# Patient Record
Sex: FEMALE | Race: WHITE | Employment: OTHER | URBAN - NONMETROPOLITAN AREA
[De-identification: names, ages, dates, MRNs, and addresses within clinical notes are randomized per-mention and may not be internally consistent; named-entity substitution may affect disease eponyms.]

---

## 2023-12-20 PROBLEM — L71.9 ROSACEA, UNSPECIFIED: Status: ACTIVE | Noted: 2022-07-29

## 2023-12-20 PROBLEM — E78.5 DYSLIPIDEMIA: Status: ACTIVE | Noted: 2021-01-06

## 2023-12-20 PROBLEM — M85.80 OSTEOPENIA: Status: ACTIVE | Noted: 2023-12-20

## 2023-12-20 PROBLEM — R71.8 ELEVATED HEMATOCRIT: Status: ACTIVE | Noted: 2022-06-23

## 2023-12-20 PROBLEM — R80.9 CONTROLLED TYPE 2 DIABETES MELLITUS WITH MICROALBUMINURIA, WITHOUT LONG-TERM CURRENT USE OF INSULIN (MULTI): Status: ACTIVE | Noted: 2023-09-20

## 2023-12-20 PROBLEM — K21.9 GERD (GASTROESOPHAGEAL REFLUX DISEASE): Status: ACTIVE | Noted: 2023-12-20

## 2023-12-20 PROBLEM — L82.1 OTHER SEBORRHEIC KERATOSIS: Status: ACTIVE | Noted: 2022-07-29

## 2023-12-20 PROBLEM — L81.4 OTHER MELANIN HYPERPIGMENTATION: Status: ACTIVE | Noted: 2022-07-29

## 2023-12-20 PROBLEM — R80.9 MICROALBUMINURIA: Status: ACTIVE | Noted: 2022-06-23

## 2023-12-20 PROBLEM — E55.9 VITAMIN D DEFICIENCY: Status: ACTIVE | Noted: 2019-11-20

## 2023-12-20 PROBLEM — E66.812 OBESITY, CLASS II, BMI 35-39.9: Status: ACTIVE | Noted: 2023-12-20

## 2023-12-20 PROBLEM — R35.89 POLYURIA: Status: ACTIVE | Noted: 2019-11-20

## 2023-12-20 PROBLEM — R53.82 CHRONIC FATIGUE: Status: ACTIVE | Noted: 2019-11-20

## 2023-12-20 PROBLEM — E11.29 CONTROLLED TYPE 2 DIABETES MELLITUS WITH MICROALBUMINURIA, WITHOUT LONG-TERM CURRENT USE OF INSULIN (MULTI): Status: ACTIVE | Noted: 2023-09-20

## 2023-12-20 PROBLEM — D18.01 HEMANGIOMA OF SKIN AND SUBCUTANEOUS TISSUE: Status: ACTIVE | Noted: 2022-07-29

## 2023-12-20 PROBLEM — E66.9 OBESITY, CLASS II, BMI 35-39.9: Status: ACTIVE | Noted: 2023-12-20

## 2023-12-20 PROBLEM — R10.12 INTERMITTENT LEFT UPPER QUADRANT ABDOMINAL PAIN: Status: ACTIVE | Noted: 2023-06-20

## 2023-12-20 PROBLEM — R63.1 POLYDIPSIA: Status: ACTIVE | Noted: 2019-11-20

## 2023-12-20 PROBLEM — R10.13 EPIGASTRIC PAIN: Status: ACTIVE | Noted: 2019-11-20

## 2023-12-20 PROBLEM — M17.10 ARTHRITIS OF KNEE: Status: ACTIVE | Noted: 2023-12-20

## 2023-12-20 PROBLEM — I10 HYPERTENSION: Status: ACTIVE | Noted: 2023-12-20

## 2023-12-20 PROBLEM — E87.1 HYPONATREMIA: Status: ACTIVE | Noted: 2023-03-20

## 2023-12-20 PROBLEM — E11.65 UNCONTROLLED TYPE 2 DIABETES MELLITUS WITH HYPERGLYCEMIA (MULTI): Status: ACTIVE | Noted: 2022-09-23

## 2023-12-20 RX ORDER — ERGOCALCIFEROL 1.25 MG/1
50000 CAPSULE ORAL WEEKLY
COMMUNITY
Start: 2023-12-19

## 2023-12-20 RX ORDER — LOSARTAN POTASSIUM AND HYDROCHLOROTHIAZIDE 12.5; 1 MG/1; MG/1
1 TABLET ORAL
COMMUNITY
Start: 2023-12-19 | End: 2023-12-20 | Stop reason: WASHOUT

## 2023-12-20 RX ORDER — OMEPRAZOLE 20 MG/1
20 CAPSULE, DELAYED RELEASE ORAL
COMMUNITY
Start: 2023-12-14

## 2023-12-20 RX ORDER — ASPIRIN 81 MG/1
81 TABLET ORAL
COMMUNITY

## 2023-12-20 RX ORDER — METRONIDAZOLE 7.5 MG/G
CREAM TOPICAL
Status: ON HOLD | COMMUNITY
Start: 2022-05-27 | End: 2024-01-11 | Stop reason: WASHOUT

## 2023-12-20 RX ORDER — METFORMIN HYDROCHLORIDE 500 MG/1
500 TABLET ORAL
COMMUNITY
Start: 2023-12-19

## 2023-12-22 ENCOUNTER — OFFICE VISIT (OUTPATIENT)
Dept: SURGERY | Facility: CLINIC | Age: 73
End: 2023-12-22
Payer: MEDICARE

## 2023-12-22 VITALS
SYSTOLIC BLOOD PRESSURE: 130 MMHG | WEIGHT: 232 LBS | HEIGHT: 68 IN | BODY MASS INDEX: 35.16 KG/M2 | DIASTOLIC BLOOD PRESSURE: 66 MMHG | TEMPERATURE: 97.5 F | HEART RATE: 90 BPM

## 2023-12-22 DIAGNOSIS — K80.20 GALLSTONES: Primary | ICD-10-CM

## 2023-12-22 DIAGNOSIS — R10.13 EPIGASTRIC PAIN: ICD-10-CM

## 2023-12-22 DIAGNOSIS — R11.0 NAUSEA: ICD-10-CM

## 2023-12-22 DIAGNOSIS — M79.2 NEURALGIA: ICD-10-CM

## 2023-12-22 PROCEDURE — 3078F DIAST BP <80 MM HG: CPT | Performed by: SURGERY

## 2023-12-22 PROCEDURE — 3075F SYST BP GE 130 - 139MM HG: CPT | Performed by: SURGERY

## 2023-12-22 PROCEDURE — 1036F TOBACCO NON-USER: CPT | Performed by: SURGERY

## 2023-12-22 PROCEDURE — 99204 OFFICE O/P NEW MOD 45 MIN: CPT | Performed by: SURGERY

## 2023-12-22 PROCEDURE — 1159F MED LIST DOCD IN RCRD: CPT | Performed by: SURGERY

## 2023-12-22 PROCEDURE — 1160F RVW MEDS BY RX/DR IN RCRD: CPT | Performed by: SURGERY

## 2023-12-22 PROCEDURE — 4010F ACE/ARB THERAPY RXD/TAKEN: CPT | Performed by: SURGERY

## 2023-12-22 ASSESSMENT — ENCOUNTER SYMPTOMS
ABDOMINAL PAIN: 1
NAUSEA: 1

## 2023-12-22 NOTE — PATIENT INSTRUCTIONS
You have gallstones but these do not appear to be causing your symptoms.  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  I will apply heat and try topical Voltaren here.  Will order HIDA scan with ejection fraction to rule out a lazy gallbladder.  This is an entity known as biliary dyskinesia.  I will also ask your primary care physician to refer you to cardiology based on the left chest pain and the left shoulder pain that radiated down your arm at your last attack

## 2023-12-22 NOTE — PROGRESS NOTES
Subjective   Patient ID: Rachele Major is a 73 y.o. female who presents for epigastric pain  HPI   This is a 71-year-old female who presented with a 1 year history of intermittent sudden onset epigastric pain.  The pain is described as severe and seems to stay in the same area it is associate with nausea.  She has the pain persisting for 1 to 2 days sometimes.  She is not able to eat.  She had some willing weight loss.  The last episode involved pain radiating to the neck and in the left shoulder.  She had a sonogram confirming gallstones.  Liver tests were normal.  She had negative colonoscopy within 5 years.  She does have a family history of colon carcinoma.  The patient takes oral omeprazole  History reviewed. No pertinent past medical history.     Current Outpatient Medications on File Prior to Visit   Medication Sig Dispense Refill    aspirin 81 mg EC tablet Take 1 tablet (81 mg) by mouth once daily.      ergocalciferol (Vitamin D-2) 1.25 MG (53345 UT) capsule Take 1 capsule (50,000 Units) by mouth once a week.      losartan (Cozaar) 2.5 mg/mL oral suspension       metFORMIN (Glucophage) 500 mg tablet Take 1 tablet (500 mg) by mouth 2 times a day with meals.      omeprazole (PriLOSEC) 20 mg DR capsule 1 capsule (20 mg).      metroNIDAZOLE (Metrocream) 0.75 % cream 1 application      [DISCONTINUED] losartan-hydrochlorothiazide (Hyzaar) 100-12.5 mg tablet Take 1 tablet by mouth once daily.       No current facility-administered medications on file prior to visit.        Review of Systems   Cardiovascular:  Positive for chest pain.   Gastrointestinal:  Positive for abdominal pain and nausea.       Vitals:    12/22/23 1343   BP: 130/66   Pulse: 90   Temp: 36.4 °C (97.5 °F)        Objective     Physical Exam  Vitals reviewed. Exam conducted with a chaperone present.   Constitutional:       Appearance: Normal appearance.   HENT:      Head: Normocephalic.      Nose: Nose normal.      Mouth/Throat:      Pharynx:  Oropharynx is clear.   Cardiovascular:      Rate and Rhythm: Normal rate and regular rhythm.      Heart sounds: Normal heart sounds.   Pulmonary:      Effort: Pulmonary effort is normal.      Breath sounds: Normal breath sounds.   Abdominal:      General: Abdomen is flat.      Palpations: Abdomen is soft. There is no mass.      Tenderness: There is abdominal tenderness in the left upper quadrant. There is no guarding.      Hernia: No hernia is present.      Comments: Locally tender to deep palpation left subcostal region.  Has a diastases.   Musculoskeletal:         General: Normal range of motion.      Cervical back: Normal range of motion.   Skin:     General: Skin is warm.   Neurological:      General: No focal deficit present.   Psychiatric:         Mood and Affect: Mood normal.         US ABD SPLEEN - NB  Order: [id number]  Impression    IMPRESSION:    Cholelithiasis.  Hepatic steatosis.  Normal spleen.      CT ABD/PEL W IVCON  Order: [id number]  Impression    IMPRESSION:    No acute findings in abdomen or pelvis and specifically no mass or fluid  collection in left upper quadrant of the abdomen.  Hepatic steatosis.  Contracted gallbladder with questionable calculus.  Recommend dedicated  ultrasound of the gallbladder.  Problem List Items Addressed This Visit       Epigastric pain    Gallstones - Primary    Neuralgia    Nausea        Assessment/Plan   Likely local thoracic neuritis.  Asymptomatic gallstones.  Possible.  Biliary dyskinesia.  Topical Voltaren to the left subcostal region.  HIDA scan with ejection fraction to rule out biliary dyskinesia    Reid Rodriguez MD

## 2023-12-29 ENCOUNTER — APPOINTMENT (OUTPATIENT)
Dept: SURGERY | Facility: CLINIC | Age: 73
End: 2023-12-29
Payer: MEDICARE

## 2024-01-04 ENCOUNTER — APPOINTMENT (OUTPATIENT)
Dept: RADIOLOGY | Facility: HOSPITAL | Age: 74
End: 2024-01-04
Payer: MEDICARE

## 2024-01-05 ENCOUNTER — APPOINTMENT (OUTPATIENT)
Dept: RADIOLOGY | Facility: HOSPITAL | Age: 74
End: 2024-01-05
Payer: MEDICARE

## 2024-01-11 ENCOUNTER — APPOINTMENT (OUTPATIENT)
Dept: RADIOLOGY | Facility: HOSPITAL | Age: 74
DRG: 418 | End: 2024-01-11
Payer: MEDICARE

## 2024-01-11 ENCOUNTER — HOSPITAL ENCOUNTER (OUTPATIENT)
Dept: RADIOLOGY | Facility: HOSPITAL | Age: 74
Discharge: HOME | DRG: 418 | End: 2024-01-11
Payer: MEDICARE

## 2024-01-11 ENCOUNTER — HOSPITAL ENCOUNTER (INPATIENT)
Facility: HOSPITAL | Age: 74
LOS: 3 days | Discharge: HOME | DRG: 418 | End: 2024-01-14
Attending: STUDENT IN AN ORGANIZED HEALTH CARE EDUCATION/TRAINING PROGRAM | Admitting: INTERNAL MEDICINE
Payer: MEDICARE

## 2024-01-11 ENCOUNTER — APPOINTMENT (OUTPATIENT)
Dept: CARDIOLOGY | Facility: HOSPITAL | Age: 74
DRG: 418 | End: 2024-01-11
Payer: MEDICARE

## 2024-01-11 DIAGNOSIS — G89.18 POST-OP PAIN: ICD-10-CM

## 2024-01-11 DIAGNOSIS — R11.2 PONV (POSTOPERATIVE NAUSEA AND VOMITING): ICD-10-CM

## 2024-01-11 DIAGNOSIS — R10.9 ABDOMINAL PAIN, UNSPECIFIED ABDOMINAL LOCATION: ICD-10-CM

## 2024-01-11 DIAGNOSIS — K80.20 GALLSTONES: ICD-10-CM

## 2024-01-11 DIAGNOSIS — R11.0 NAUSEA: ICD-10-CM

## 2024-01-11 DIAGNOSIS — K83.1 COMMON BILE DUCT (CBD) OBSTRUCTION (CMS-HCC): Primary | ICD-10-CM

## 2024-01-11 DIAGNOSIS — Z98.890 PONV (POSTOPERATIVE NAUSEA AND VOMITING): ICD-10-CM

## 2024-01-11 DIAGNOSIS — Z90.49 S/P CHOLECYSTECTOMY: ICD-10-CM

## 2024-01-11 DIAGNOSIS — R10.13 EPIGASTRIC PAIN: ICD-10-CM

## 2024-01-11 LAB
ALBUMIN SERPL BCP-MCNC: 4.2 G/DL (ref 3.4–5)
ALP SERPL-CCNC: 205 U/L (ref 33–136)
ALT SERPL W P-5'-P-CCNC: 145 U/L (ref 7–45)
ANION GAP SERPL CALC-SCNC: 20 MMOL/L (ref 10–20)
AST SERPL W P-5'-P-CCNC: 208 U/L (ref 9–39)
BASOPHILS # BLD AUTO: 0.05 X10*3/UL (ref 0–0.1)
BASOPHILS NFR BLD AUTO: 0.5 %
BILIRUB DIRECT SERPL-MCNC: 2.6 MG/DL (ref 0–0.3)
BILIRUB SERPL-MCNC: 4.3 MG/DL (ref 0–1.2)
BUN SERPL-MCNC: 21 MG/DL (ref 6–23)
CALCIUM SERPL-MCNC: 9.6 MG/DL (ref 8.6–10.3)
CARDIAC TROPONIN I PNL SERPL HS: 8 NG/L (ref 0–13)
CARDIAC TROPONIN I PNL SERPL HS: 9 NG/L (ref 0–13)
CHLORIDE SERPL-SCNC: 97 MMOL/L (ref 98–107)
CO2 SERPL-SCNC: 25 MMOL/L (ref 21–32)
CREAT SERPL-MCNC: 1.17 MG/DL (ref 0.5–1.05)
CRP SERPL-MCNC: 5.55 MG/DL
EGFRCR SERPLBLD CKD-EPI 2021: 50 ML/MIN/1.73M*2
EOSINOPHIL # BLD AUTO: 0.04 X10*3/UL (ref 0–0.4)
EOSINOPHIL NFR BLD AUTO: 0.4 %
ERYTHROCYTE [DISTWIDTH] IN BLOOD BY AUTOMATED COUNT: 12.6 % (ref 11.5–14.5)
GLUCOSE BLD MANUAL STRIP-MCNC: 121 MG/DL (ref 74–99)
GLUCOSE SERPL-MCNC: 159 MG/DL (ref 74–99)
HCT VFR BLD AUTO: 46.3 % (ref 36–46)
HGB BLD-MCNC: 14.7 G/DL (ref 12–16)
IMM GRANULOCYTES # BLD AUTO: 0.03 X10*3/UL (ref 0–0.5)
IMM GRANULOCYTES NFR BLD AUTO: 0.3 % (ref 0–0.9)
INR PPP: 1.1 (ref 0.9–1.1)
LIPASE SERPL-CCNC: 19 U/L (ref 9–82)
LYMPHOCYTES # BLD AUTO: 1.28 X10*3/UL (ref 0.8–3)
LYMPHOCYTES NFR BLD AUTO: 11.5 %
MAGNESIUM SERPL-MCNC: 1.43 MG/DL (ref 1.6–2.4)
MCH RBC QN AUTO: 27.8 PG (ref 26–34)
MCHC RBC AUTO-ENTMCNC: 31.7 G/DL (ref 32–36)
MCV RBC AUTO: 88 FL (ref 80–100)
MONOCYTES # BLD AUTO: 1.07 X10*3/UL (ref 0.05–0.8)
MONOCYTES NFR BLD AUTO: 9.6 %
NEUTROPHILS # BLD AUTO: 8.62 X10*3/UL (ref 1.6–5.5)
NEUTROPHILS NFR BLD AUTO: 77.7 %
NRBC BLD-RTO: 0 /100 WBCS (ref 0–0)
PLATELET # BLD AUTO: 305 X10*3/UL (ref 150–450)
POTASSIUM SERPL-SCNC: 3.8 MMOL/L (ref 3.5–5.3)
PROT SERPL-MCNC: 7.3 G/DL (ref 6.4–8.2)
PROTHROMBIN TIME: 12.5 SECONDS (ref 9.8–12.8)
RBC # BLD AUTO: 5.28 X10*6/UL (ref 4–5.2)
SODIUM SERPL-SCNC: 138 MMOL/L (ref 136–145)
WBC # BLD AUTO: 11.1 X10*3/UL (ref 4.4–11.3)

## 2024-01-11 PROCEDURE — 96361 HYDRATE IV INFUSION ADD-ON: CPT

## 2024-01-11 PROCEDURE — 96365 THER/PROPH/DIAG IV INF INIT: CPT

## 2024-01-11 PROCEDURE — 84484 ASSAY OF TROPONIN QUANT: CPT | Performed by: PHYSICIAN ASSISTANT

## 2024-01-11 PROCEDURE — 82947 ASSAY GLUCOSE BLOOD QUANT: CPT

## 2024-01-11 PROCEDURE — 3430000001 HC RX 343 DIAGNOSTIC RADIOPHARMACEUTICALS: Performed by: SURGERY

## 2024-01-11 PROCEDURE — 85025 COMPLETE CBC W/AUTO DIFF WBC: CPT | Performed by: PHYSICIAN ASSISTANT

## 2024-01-11 PROCEDURE — 86140 C-REACTIVE PROTEIN: CPT

## 2024-01-11 PROCEDURE — 96375 TX/PRO/DX INJ NEW DRUG ADDON: CPT

## 2024-01-11 PROCEDURE — 82248 BILIRUBIN DIRECT: CPT | Performed by: PHYSICIAN ASSISTANT

## 2024-01-11 PROCEDURE — 74177 CT ABD & PELVIS W/CONTRAST: CPT

## 2024-01-11 PROCEDURE — 36415 COLL VENOUS BLD VENIPUNCTURE: CPT | Performed by: PHYSICIAN ASSISTANT

## 2024-01-11 PROCEDURE — 87040 BLOOD CULTURE FOR BACTERIA: CPT | Mod: GEALAB

## 2024-01-11 PROCEDURE — 82947 ASSAY GLUCOSE BLOOD QUANT: CPT | Mod: 91

## 2024-01-11 PROCEDURE — 2500000004 HC RX 250 GENERAL PHARMACY W/ HCPCS (ALT 636 FOR OP/ED)

## 2024-01-11 PROCEDURE — 78226 HEPATOBILIARY SYSTEM IMAGING: CPT | Performed by: STUDENT IN AN ORGANIZED HEALTH CARE EDUCATION/TRAINING PROGRAM

## 2024-01-11 PROCEDURE — 83735 ASSAY OF MAGNESIUM: CPT | Performed by: PHYSICIAN ASSISTANT

## 2024-01-11 PROCEDURE — 78227 HEPATOBIL SYST IMAGE W/DRUG: CPT

## 2024-01-11 PROCEDURE — A9537 TC99M MEBROFENIN: HCPCS | Performed by: SURGERY

## 2024-01-11 PROCEDURE — 99285 EMERGENCY DEPT VISIT HI MDM: CPT | Performed by: STUDENT IN AN ORGANIZED HEALTH CARE EDUCATION/TRAINING PROGRAM

## 2024-01-11 PROCEDURE — 80076 HEPATIC FUNCTION PANEL: CPT | Performed by: PHYSICIAN ASSISTANT

## 2024-01-11 PROCEDURE — 1100000001 HC PRIVATE ROOM DAILY

## 2024-01-11 PROCEDURE — 85610 PROTHROMBIN TIME: CPT | Performed by: PHYSICIAN ASSISTANT

## 2024-01-11 PROCEDURE — 74177 CT ABD & PELVIS W/CONTRAST: CPT | Performed by: RADIOLOGY

## 2024-01-11 PROCEDURE — 2550000001 HC RX 255 CONTRASTS: Performed by: PHYSICIAN ASSISTANT

## 2024-01-11 PROCEDURE — 80053 COMPREHEN METABOLIC PANEL: CPT | Performed by: PHYSICIAN ASSISTANT

## 2024-01-11 PROCEDURE — 93005 ELECTROCARDIOGRAM TRACING: CPT

## 2024-01-11 PROCEDURE — 83690 ASSAY OF LIPASE: CPT | Performed by: PHYSICIAN ASSISTANT

## 2024-01-11 PROCEDURE — 2500000004 HC RX 250 GENERAL PHARMACY W/ HCPCS (ALT 636 FOR OP/ED): Performed by: PHYSICIAN ASSISTANT

## 2024-01-11 RX ORDER — HYDROCHLOROTHIAZIDE 25 MG/1
12.5 TABLET ORAL DAILY
Status: DISCONTINUED | OUTPATIENT
Start: 2024-01-11 | End: 2024-01-14 | Stop reason: HOSPADM

## 2024-01-11 RX ORDER — MORPHINE SULFATE 4 MG/ML
4 INJECTION INTRAVENOUS ONCE
Status: DISCONTINUED | OUTPATIENT
Start: 2024-01-11 | End: 2024-01-11

## 2024-01-11 RX ORDER — ONDANSETRON HYDROCHLORIDE 2 MG/ML
4 INJECTION, SOLUTION INTRAVENOUS ONCE
Status: COMPLETED | OUTPATIENT
Start: 2024-01-11 | End: 2024-01-11

## 2024-01-11 RX ORDER — SODIUM CHLORIDE, SODIUM LACTATE, POTASSIUM CHLORIDE, CALCIUM CHLORIDE 600; 310; 30; 20 MG/100ML; MG/100ML; MG/100ML; MG/100ML
50 INJECTION, SOLUTION INTRAVENOUS CONTINUOUS
Status: DISCONTINUED | OUTPATIENT
Start: 2024-01-11 | End: 2024-01-14 | Stop reason: HOSPADM

## 2024-01-11 RX ORDER — ONDANSETRON HYDROCHLORIDE 2 MG/ML
4 INJECTION, SOLUTION INTRAVENOUS EVERY 8 HOURS PRN
Status: DISCONTINUED | OUTPATIENT
Start: 2024-01-11 | End: 2024-01-14 | Stop reason: HOSPADM

## 2024-01-11 RX ORDER — MORPHINE SULFATE 4 MG/ML
INJECTION INTRAVENOUS
Status: DISCONTINUED
Start: 2024-01-11 | End: 2024-01-11 | Stop reason: WASHOUT

## 2024-01-11 RX ORDER — ASPIRIN 81 MG/1
81 TABLET ORAL
Status: DISCONTINUED | OUTPATIENT
Start: 2024-01-12 | End: 2024-01-14 | Stop reason: HOSPADM

## 2024-01-11 RX ORDER — LANOLIN ALCOHOL/MO/W.PET/CERES
400 CREAM (GRAM) TOPICAL ONCE
Status: COMPLETED | OUTPATIENT
Start: 2024-01-11 | End: 2024-01-11

## 2024-01-11 RX ORDER — DEXTROSE 50 % IN WATER (D50W) INTRAVENOUS SYRINGE
25
Status: DISCONTINUED | OUTPATIENT
Start: 2024-01-11 | End: 2024-01-14 | Stop reason: HOSPADM

## 2024-01-11 RX ORDER — KIT FOR THE PREPARATION OF TECHNETIUM TC 99M MEBROFENIN 45 MG/10ML
5 INJECTION, POWDER, LYOPHILIZED, FOR SOLUTION INTRAVENOUS
Status: COMPLETED | OUTPATIENT
Start: 2024-01-11 | End: 2024-01-11

## 2024-01-11 RX ORDER — INSULIN LISPRO 100 [IU]/ML
0-5 INJECTION, SOLUTION INTRAVENOUS; SUBCUTANEOUS EVERY 4 HOURS
Status: DISCONTINUED | OUTPATIENT
Start: 2024-01-11 | End: 2024-01-14 | Stop reason: HOSPADM

## 2024-01-11 RX ORDER — PANTOPRAZOLE SODIUM 40 MG/1
40 TABLET, DELAYED RELEASE ORAL
Status: DISCONTINUED | OUTPATIENT
Start: 2024-01-12 | End: 2024-01-14 | Stop reason: HOSPADM

## 2024-01-11 RX ORDER — LOSARTAN POTASSIUM 50 MG/1
100 TABLET ORAL DAILY
Status: DISCONTINUED | OUTPATIENT
Start: 2024-01-11 | End: 2024-01-14 | Stop reason: HOSPADM

## 2024-01-11 RX ORDER — DEXTROSE MONOHYDRATE 100 MG/ML
0.3 INJECTION, SOLUTION INTRAVENOUS ONCE AS NEEDED
Status: DISCONTINUED | OUTPATIENT
Start: 2024-01-11 | End: 2024-01-14 | Stop reason: HOSPADM

## 2024-01-11 RX ORDER — SODIUM CHLORIDE 9 MG/ML
125 INJECTION, SOLUTION INTRAVENOUS CONTINUOUS
Status: DISCONTINUED | OUTPATIENT
Start: 2024-01-11 | End: 2024-01-11

## 2024-01-11 RX ORDER — ONDANSETRON 4 MG/1
4 TABLET, FILM COATED ORAL EVERY 8 HOURS PRN
Status: DISCONTINUED | OUTPATIENT
Start: 2024-01-11 | End: 2024-01-14 | Stop reason: HOSPADM

## 2024-01-11 RX ADMIN — IOHEXOL 75 ML: 350 INJECTION, SOLUTION INTRAVENOUS at 16:50

## 2024-01-11 RX ADMIN — SODIUM CHLORIDE 125 ML/HR: 9 INJECTION, SOLUTION INTRAVENOUS at 17:50

## 2024-01-11 RX ADMIN — PIPERACILLIN SODIUM AND TAZOBACTAM SODIUM 4.5 G: 4; .5 INJECTION, SOLUTION INTRAVENOUS at 17:50

## 2024-01-11 RX ADMIN — Medication 400 MG: at 18:57

## 2024-01-11 RX ADMIN — ONDANSETRON 4 MG: 2 INJECTION INTRAMUSCULAR; INTRAVENOUS at 16:11

## 2024-01-11 RX ADMIN — KIT FOR THE PREPARATION OF TECHNETIUM TC 99M MEBROFENIN 5 MILLICURIE: 45 INJECTION, POWDER, LYOPHILIZED, FOR SOLUTION INTRAVENOUS at 12:50

## 2024-01-11 RX ADMIN — SODIUM CHLORIDE 1000 ML: 9 INJECTION, SOLUTION INTRAVENOUS at 15:55

## 2024-01-11 RX ADMIN — SODIUM CHLORIDE, POTASSIUM CHLORIDE, SODIUM LACTATE AND CALCIUM CHLORIDE 75 ML/HR: 600; 310; 30; 20 INJECTION, SOLUTION INTRAVENOUS at 19:57

## 2024-01-11 RX ADMIN — PIPERACILLIN SODIUM AND TAZOBACTAM SODIUM 4.5 G: 4; .5 INJECTION, SOLUTION INTRAVENOUS at 23:36

## 2024-01-11 SDOH — SOCIAL STABILITY: SOCIAL INSECURITY: HAVE YOU HAD THOUGHTS OF HARMING ANYONE ELSE?: NO

## 2024-01-11 SDOH — ECONOMIC STABILITY: FOOD INSECURITY: WITHIN THE PAST 12 MONTHS, YOU WORRIED THAT YOUR FOOD WOULD RUN OUT BEFORE YOU GOT THE MONEY TO BUY MORE.: NEVER TRUE

## 2024-01-11 SDOH — SOCIAL STABILITY: SOCIAL INSECURITY: WERE YOU ABLE TO COMPLETE ALL THE BEHAVIORAL HEALTH SCREENINGS?: YES

## 2024-01-11 SDOH — ECONOMIC STABILITY: HOUSING INSECURITY: IN THE LAST 12 MONTHS, WAS THERE A TIME WHEN YOU WERE NOT ABLE TO PAY THE MORTGAGE OR RENT ON TIME?: NO

## 2024-01-11 SDOH — ECONOMIC STABILITY: HOUSING INSECURITY: IN THE LAST 12 MONTHS, HOW MANY PLACES HAVE YOU LIVED?: 1

## 2024-01-11 SDOH — ECONOMIC STABILITY: HOUSING INSECURITY
IN THE LAST 12 MONTHS, WAS THERE A TIME WHEN YOU DID NOT HAVE A STEADY PLACE TO SLEEP OR SLEPT IN A SHELTER (INCLUDING NOW)?: NO

## 2024-01-11 SDOH — ECONOMIC STABILITY: INCOME INSECURITY: IN THE LAST 12 MONTHS, WAS THERE A TIME WHEN YOU WERE NOT ABLE TO PAY THE MORTGAGE OR RENT ON TIME?: NO

## 2024-01-11 SDOH — ECONOMIC STABILITY: GENERAL

## 2024-01-11 SDOH — ECONOMIC STABILITY: FOOD INSECURITY: WITHIN THE PAST 12 MONTHS, THE FOOD YOU BOUGHT JUST DIDN'T LAST AND YOU DIDN'T HAVE MONEY TO GET MORE.: NEVER TRUE

## 2024-01-11 SDOH — SOCIAL STABILITY: SOCIAL INSECURITY: HAS ANYONE EVER THREATENED TO HURT YOUR FAMILY OR YOUR PETS?: NO

## 2024-01-11 SDOH — ECONOMIC STABILITY: FOOD INSECURITY: WITHIN THE PAST 12 MONTHS, THE FOOD YOU BOUGHT JUST DIDN’T LAST AND YOU DIDN’T HAVE MONEY TO GET MORE.: NEVER TRUE

## 2024-01-11 SDOH — SOCIAL STABILITY: SOCIAL INSECURITY: ARE THERE ANY APPARENT SIGNS OF INJURIES/BEHAVIORS THAT COULD BE RELATED TO ABUSE/NEGLECT?: NO

## 2024-01-11 SDOH — ECONOMIC STABILITY: FOOD INSECURITY: WITHIN THE PAST 12 MONTHS, YOU WORRIED THAT YOUR FOOD WOULD RUN OUT BEFORE YOU GOT MONEY TO BUY MORE.: NEVER TRUE

## 2024-01-11 SDOH — SOCIAL STABILITY: SOCIAL INSECURITY: ARE YOU OR HAVE YOU BEEN THREATENED OR ABUSED PHYSICALLY, EMOTIONALLY, OR SEXUALLY BY ANYONE?: NO

## 2024-01-11 SDOH — SOCIAL STABILITY: SOCIAL INSECURITY: DOES ANYONE TRY TO KEEP YOU FROM HAVING/CONTACTING OTHER FRIENDS OR DOING THINGS OUTSIDE YOUR HOME?: NO

## 2024-01-11 SDOH — ECONOMIC STABILITY: HOUSING INSECURITY

## 2024-01-11 SDOH — ECONOMIC STABILITY: TRANSPORTATION INSECURITY
IN THE PAST 12 MONTHS, HAS THE LACK OF TRANSPORTATION KEPT YOU FROM MEDICAL APPOINTMENTS OR FROM GETTING MEDICATIONS?: NO

## 2024-01-11 SDOH — SOCIAL STABILITY: SOCIAL INSECURITY: ABUSE: ADULT

## 2024-01-11 SDOH — ECONOMIC STABILITY: FOOD INSECURITY

## 2024-01-11 SDOH — SOCIAL STABILITY: SOCIAL INSECURITY: DO YOU FEEL UNSAFE GOING BACK TO THE PLACE WHERE YOU ARE LIVING?: NO

## 2024-01-11 SDOH — ECONOMIC STABILITY: TRANSPORTATION INSECURITY
IN THE PAST 12 MONTHS, HAS LACK OF TRANSPORTATION KEPT YOU FROM MEETINGS, WORK, OR FROM GETTING THINGS NEEDED FOR DAILY LIVING?: NO

## 2024-01-11 SDOH — ECONOMIC STABILITY: TRANSPORTATION INSECURITY: IN THE PAST 12 MONTHS, HAS LACK OF TRANSPORTATION KEPT YOU FROM MEDICAL APPOINTMENTS OR FROM GETTING MEDICATIONS?: NO

## 2024-01-11 SDOH — ECONOMIC STABILITY: HOUSING INSECURITY: IN THE PAST 12 MONTHS HAS THE ELECTRIC, GAS, OIL, OR WATER COMPANY THREATENED TO SHUT OFF SERVICES IN YOUR HOME?: NO

## 2024-01-11 SDOH — ECONOMIC STABILITY: TRANSPORTATION INSECURITY

## 2024-01-11 SDOH — SOCIAL STABILITY: SOCIAL INSECURITY: DO YOU FEEL ANYONE HAS EXPLOITED OR TAKEN ADVANTAGE OF YOU FINANCIALLY OR OF YOUR PERSONAL PROPERTY?: NO

## 2024-01-11 ASSESSMENT — LIFESTYLE VARIABLES
REASON UNABLE TO ASSESS: NO
AUDIT-C TOTAL SCORE: 0
HAVE YOU EVER FELT YOU SHOULD CUT DOWN ON YOUR DRINKING: NO
SKIP TO QUESTIONS 9-10: 1
HOW MANY STANDARD DRINKS CONTAINING ALCOHOL DO YOU HAVE ON A TYPICAL DAY: PATIENT DOES NOT DRINK
HOW OFTEN DO YOU HAVE 6 OR MORE DRINKS ON ONE OCCASION: NEVER
HAVE PEOPLE ANNOYED YOU BY CRITICIZING YOUR DRINKING: NO
EVER FELT BAD OR GUILTY ABOUT YOUR DRINKING: NO
HOW OFTEN DO YOU HAVE A DRINK CONTAINING ALCOHOL: NEVER
EVER HAD A DRINK FIRST THING IN THE MORNING TO STEADY YOUR NERVES TO GET RID OF A HANGOVER: NO
AUDIT-C TOTAL SCORE: 0

## 2024-01-11 ASSESSMENT — ACTIVITIES OF DAILY LIVING (ADL)
WALKS IN HOME: INDEPENDENT
HEARING - RIGHT EAR: FUNCTIONAL
LACK_OF_TRANSPORTATION: NO
TOILETING: INDEPENDENT
GROOMING: INDEPENDENT
FEEDING YOURSELF: INDEPENDENT
PATIENT'S MEMORY ADEQUATE TO SAFELY COMPLETE DAILY ACTIVITIES?: YES
HEARING - LEFT EAR: FUNCTIONAL
DRESSING YOURSELF: INDEPENDENT
BATHING: INDEPENDENT
LACK_OF_TRANSPORTATION: NO
JUDGMENT_ADEQUATE_SAFELY_COMPLETE_DAILY_ACTIVITIES: YES
ADEQUATE_TO_COMPLETE_ADL: YES

## 2024-01-11 ASSESSMENT — COGNITIVE AND FUNCTIONAL STATUS - GENERAL
DAILY ACTIVITIY SCORE: 24
PATIENT BASELINE BEDBOUND: NO
MOBILITY SCORE: 24

## 2024-01-11 ASSESSMENT — COLUMBIA-SUICIDE SEVERITY RATING SCALE - C-SSRS
2. HAVE YOU ACTUALLY HAD ANY THOUGHTS OF KILLING YOURSELF?: NO
1. IN THE PAST MONTH, HAVE YOU WISHED YOU WERE DEAD OR WISHED YOU COULD GO TO SLEEP AND NOT WAKE UP?: NO
6. HAVE YOU EVER DONE ANYTHING, STARTED TO DO ANYTHING, OR PREPARED TO DO ANYTHING TO END YOUR LIFE?: NO
2. HAVE YOU ACTUALLY HAD ANY THOUGHTS OF KILLING YOURSELF?: NO
1. IN THE PAST MONTH, HAVE YOU WISHED YOU WERE DEAD OR WISHED YOU COULD GO TO SLEEP AND NOT WAKE UP?: NO
6. HAVE YOU EVER DONE ANYTHING, STARTED TO DO ANYTHING, OR PREPARED TO DO ANYTHING TO END YOUR LIFE?: NO

## 2024-01-11 ASSESSMENT — PATIENT HEALTH QUESTIONNAIRE - PHQ9
2. FEELING DOWN, DEPRESSED OR HOPELESS: NOT AT ALL
SUM OF ALL RESPONSES TO PHQ9 QUESTIONS 1 & 2: 0
1. LITTLE INTEREST OR PLEASURE IN DOING THINGS: NOT AT ALL

## 2024-01-11 ASSESSMENT — PAIN DESCRIPTION - PAIN TYPE: TYPE: ACUTE PAIN

## 2024-01-11 ASSESSMENT — PAIN DESCRIPTION - DESCRIPTORS: DESCRIPTORS: ACHING

## 2024-01-11 ASSESSMENT — PAIN - FUNCTIONAL ASSESSMENT
PAIN_FUNCTIONAL_ASSESSMENT: 0-10
PAIN_FUNCTIONAL_ASSESSMENT: 0-10

## 2024-01-11 ASSESSMENT — PAIN DESCRIPTION - LOCATION: LOCATION: ABDOMEN

## 2024-01-11 ASSESSMENT — PAIN SCALES - GENERAL
PAINLEVEL_OUTOF10: 0 - NO PAIN
PAINLEVEL_OUTOF10: 2

## 2024-01-11 ASSESSMENT — SOCIAL DETERMINANTS OF HEALTH (SDOH): IN THE PAST 12 MONTHS, HAS THE ELECTRIC, GAS, OIL, OR WATER COMPANY THREATENED TO SHUT OFF SERVICE IN YOUR HOME?: NO

## 2024-01-11 NOTE — ED PROVIDER NOTES
HPI   Chief Complaint   Patient presents with    Abdominal Pain       This is a 73-year-old female coming in for abnormal nuclear scan.  Patient has possible obstruction of the bile duct.  Patient has had intermittent abdominal discomfort mainly while eating for the last few months.  She states that over the last month it has been occurring more frequently.  Patient had an outpatient scan done by her general surgeon and after the results was told to come here to the emergency room.  She has been having nausea and vomiting.  She states last night she ate something and had multiple episodes of vomiting afterwards.  This is a normal occurrence for her.  She has had a subjective fever.  She denies any diarrhea.  She states she is otherwise healthy with no major medical problems.  She has never had abdominal surgeries.      History provided by:  Patient                      [place] Coma Scale Score: 15                  Patient History   History reviewed. No pertinent past medical history.  Past Surgical History:   Procedure Laterality Date    WRIST SURGERY       No family history on file.  Social History     Tobacco Use    Smoking status: Never    Smokeless tobacco: Never   Substance Use Topics    Alcohol use: Never    Drug use: Never       Physical Exam   ED Triage Vitals [01/11/24 1546]   Temp Heart Rate Resp BP   36.4 °C (97.5 °F) 98 20 107/64      SpO2 Temp Source Heart Rate Source Patient Position   95 % Tympanic Monitor --      BP Location FiO2 (%)     -- --       Physical Exam  Vitals and nursing note reviewed.   Constitutional:       General: She is not in acute distress.     Appearance: Normal appearance. She is not toxic-appearing.   HENT:      Head: Normocephalic and atraumatic.      Nose: Nose normal.      Mouth/Throat:      Mouth: Mucous membranes are moist.      Pharynx: Oropharynx is clear.   Eyes:      Extraocular Movements: Extraocular movements intact.      Conjunctiva/sclera: Conjunctivae normal.       Pupils: Pupils are equal, round, and reactive to light.   Cardiovascular:      Rate and Rhythm: Regular rhythm.      Pulses: Normal pulses.      Heart sounds: Normal heart sounds.   Pulmonary:      Effort: Pulmonary effort is normal. No respiratory distress.      Breath sounds: Normal breath sounds.   Abdominal:      General: Abdomen is flat. Bowel sounds are normal.      Palpations: Abdomen is soft.      Tenderness: There is abdominal tenderness in the epigastric area and left upper quadrant.      Comments: Patient is tender to palpation to the epigastric and left upper quadrant only.   Musculoskeletal:         General: Normal range of motion.      Cervical back: Normal range of motion and neck supple.   Skin:     General: Skin is warm and dry.      Coloration: Skin is not jaundiced or pale.      Findings: No bruising.   Neurological:      General: No focal deficit present.      Mental Status: She is alert and oriented to person, place, and time. Mental status is at baseline.   Psychiatric:         Mood and Affect: Mood normal.         Behavior: Behavior normal.         ED Course & MDM   ED Course as of 01/11/24 1759   Thu Jan 11, 2024   1734 CT abdomen pelvis w IV contrast [HD]   1756 Emergency Medicine Supervising Resident Attestation:    The patient was seen with the TROY/raul resident.  I have personally performed a substantive portion of the encounter.  I have seen and examined the patient; agree with the workup, evaluation, MDM, management and diagnosis.  The care plan has been discussed with the TROY/raul resident; I have reviewed the documentation and agree with the documented findings.      My assessment reveals well-appearing female complaining of intermittent left upper quadrant pain and nausea.  Pain resolved at this time after symptomatic management.  Reviewed her laboratory evaluation which demonstrates elevated LFTs.  Concern for biliary obstruction, discussed with GI and she will be admitted at  [facility].    Dank Vick MD  PGY 3 Emergency Medicine     [SH]      ED Course User Index  [HD] Dolly Jerome DO  [SH] Herb Vick MD         Diagnoses as of 01/11/24 1759   Common bile duct (CBD) obstruction       Medical Decision Making  Summary:  Medical Decision Making:   Patient presented as described in HPI. Patient case including ROS, PE, and treatment and plan discussed with ED attending if attached as cosigner. Results from labs and or imaging included below if completed. Rachele Major  is a 73 y.o. coming in for Patient presents with:  Abdominal Pain  .  Outpatient scan was reviewed.  Patient has questionable obstruction of the bile duct.  Lab work will be completed including hepatic function panel.  She does not appear toxic.  Her vitals are stable here.  A CT will be completed for further evaluation of her discomfort as again she is more tender to the epigastric area in the left upper quadrant and results are pending.  Lab work came back showing transaminitis.  Normal white blood cell count.  Magnesium is slightly low and will be replenished.  No other concerning electrolyte abnormalities.  Patient was given nausea medication and she refused pain medication.  CT was completed on patient.  Results are pending.  Spoke with patient's general surgeon who sent patient in and he advised to do antibiotics but admit patient here for further treatment secondary to bed availability.  Spoke with our surgeon who stated that they wanted the patient to have an ERCP.  Consulted GI who agrees to get the ERCP with hospitalizing patient here.  General surgery and GI will be consulted and patient will be hospitalized under the medicine team here at [facility] and she is started on IV Zosyn.    I also explained the plan and treatment course. Patient/guardian is in agreement with plan, treatment course, and states verbally that they will comply.    Labs Reviewed  CBC WITH AUTO DIFFERENTIAL - Abnormal     WBC                            11.1                   nRBC                          0.0                    RBC                           5.28 (*)               Hemoglobin                    14.7                   Hematocrit                    46.3 (*)               MCV                           88                     MCH                           27.8                   MCHC                          31.7 (*)               RDW                           12.6                   Platelets                     305                    Neutrophils %                 77.7                   Immature Granulocytes %, Automated   0.3                    Lymphocytes %                 11.5                   Monocytes %                   9.6                    Eosinophils %                 0.4                    Basophils %                   0.5                    Neutrophils Absolute          8.62 (*)               Immature Granulocytes Absolute, Au*   0.03                   Lymphocytes Absolute          1.28                   Monocytes Absolute            1.07 (*)               Eosinophils Absolute          0.04                   Basophils Absolute            0.05                BASIC METABOLIC PANEL - Abnormal     Glucose                       159 (*)                Sodium                        138                    Potassium                     3.8                    Chloride                      97 (*)                 Bicarbonate                   25                     Anion Gap                     20                     Urea Nitrogen                 21                     Creatinine                    1.17 (*)               eGFR                          50 (*)                 Calcium                       9.6                 HEPATIC FUNCTION PANEL - Abnormal     Albumin                       4.2                    Bilirubin, Total              4.3 (*)                Bilirubin, Direct             2.6 (*)                Alkaline  Phosphatase          205 (*)                ALT                           145 (*)                AST                           208 (*)                Total Protein                 7.3                 MAGNESIUM - Abnormal     Magnesium                     1.43 (*)            LIPASE - Normal     Lipase                        19                       Narrative: Venipuncture immediately after or during the administration of Metamizole may lead to falsely low results. Testing should be performed immediately prior to Metamizole dosing.  SERIAL TROPONIN-INITIAL - Normal     Troponin I, High Sensitivity   8                        Narrative: Less than 99th percentile of normal range cutoff-                Female and children under 18 years old <14 ng/L; Male <21 ng/L: Negative                Repeat testing should be performed if clinically indicated.                                 Female and children under 18 years old 14-50 ng/L; Male 21-50 ng/L:                Consistent with possible cardiac damage and possible increased clinical                 risk. Serial measurements may help to assess extent of myocardial damage.                                 >50 ng/L: Consistent with cardiac damage, increased clinical risk and                myocardial infarction. Serial measurements may help assess extent of                 myocardial damage.                                  NOTE: Children less than 1 year old may have higher baseline troponin                 levels and results should be interpreted in conjunction with the overall                 clinical context.                                 NOTE: Troponin I testing is performed using a different                 testing methodology at [facility] than at other                 [facility]. Direct result comparisons should only                 be made within the same method.  SERIAL TROPONIN, 1 HOUR - Normal     Troponin I, High Sensitivity   9                         Narrative: Less than 99th percentile of normal range cutoff-                Female and children under 18 years old <14 ng/L; Male <21 ng/L: Negative                Repeat testing should be performed if clinically indicated.                                 Female and children under 18 years old 14-50 ng/L; Male 21-50 ng/L:                Consistent with possible cardiac damage and possible increased clinical                 risk. Serial measurements may help to assess extent of myocardial damage.                                 >50 ng/L: Consistent with cardiac damage, increased clinical risk and                myocardial infarction. Serial measurements may help assess extent of                 myocardial damage.                                  NOTE: Children less than 1 year old may have higher baseline troponin                 levels and results should be interpreted in conjunction with the overall                 clinical context.                                 NOTE: Troponin I testing is performed using a different                 testing methodology at [facility] than at other                 [facility]. Direct result comparisons should only                 be made within the same method.  TROPONIN SERIES- (INITIAL, 1 HR)       Narrative: The following orders were created for panel order Troponin Series, (0, 1 HR).                Procedure                               Abnormality         Status                                   ---------                               -----------         ------                                   Troponin I, High Sensiti...[[id number]]  Normal              Final result                             Troponin, High Sensitivi...[[id number]]  Normal              Final result                                             Please view results for these tests on the individual orders.  C-REACTIVE PROTEIN   CT abdomen pelvis w IV contrast    (Results Pending)        Tests/Medications/Escalations of Care considered but not given: As in MDM    Patient care discussed with: N/A  Social Determinants affecting care: N/A    Final diagnosis and disposition as documented       Hospitalize. I discussed the differential; results and admit plan with the patient and/or family/friend/caregiver if present.  I emphasized the importance of hospitalization need for re-evaluation/continued monitoring/interventions.. They agreed that if they feel their condition is worsening or if they have any other concern they should alert staff immediately for further assistance. I gave the patient an opportunity to ask all questions they had and answered all of them accordingly. The patient and/or family/friend/caregiver expressed understanding verbally and that they would comply.    Disposition:  Hospitalize to medical floor under Dr. Cabrera per their orders. Discussed findings and treatment done here in ED with admitting physician. It would be a risk to discharge the patient in their condition due to possibility of deterioration in their condition and the need for urgent interventions.    This note has been transcribed using voice recognition and may contain grammatical errors, misplaced words, incorrect words, incorrect phrases or other errors.         Procedure  Procedures     Javi Herrera PA-C  01/11/24 180

## 2024-01-11 NOTE — ED TRIAGE NOTES
CERTIFICATE OF RETURN TO WORK    March 19, 2021      Re:   Tiffanie Camarillo  [id number] [address]                   This is to certify that Tiffanie Camarillo was seen today, 3/19/2021, for an appointment and can return to light work on 3/20/21    RESTRICTIONS: No lifting over 5 lb with left hand. Wear wrist brace at all times while working.      REMARKS: Follow-up after MRI.        SIGNATURE:___________________________________________,   3/19/2021      Ezra Reece MD           Orthopaedics  [address]  [address]  Ph: [phone]       Patient sent from nuclear medicine after a HIDA scan that showed a possible common bile duct obstruction. Patient states she has had abdominal pain and nausea with vomiting for several months. Patient denies abdominal pain currently but does have some nausea.

## 2024-01-11 NOTE — H&P
Patient: Rachele Major  Room/bed: AC10/AC10  Admitted on: 1/11/2024    Age: 73 y.o. Gender: Female   Code Status:  No Order   Admitting Dx: No admission diagnoses are documented for this encounter.    MRN: [id number]  PCP: Toñito Payne DO  Preferred Pharm: [unfilled]    Attending: Reji Cabrera DO Resident: James Mathews DO  TCC: No care team member to display      Chief Complaint   Patient: Rachele Major is a 73 y.o. female who presented to the hospital for abnormal nuclear scan.    HPI   HPI: Rachele Major is a 73 y.o. female with history of obesity and T2DM who presented to the hospital for abnormal nuclear scan. Patient reports that for the past couple of years she has been experiencing left sided abdominal pain. She describes the pain as knife-like pain that occurs approximately 1 hour after meals and lasts approximately 1-1.5 hours. With this pain, she experiences nausea, vomiting, chills, and sweating. She notes that these episodes of pain increased in frequency beginning in August, occurring once a month and then more recently began occurring every week since Christmas. She has not determined any particular type of food that is more likely to cause these episodes over others. Additionally, these episodes leave her feeling ill and fatigued for a day after and she has noticed that she has diarrhea and orange colored urine after. Her last episode of pain and vomiting was this morning at 2 am this morning. This time, it was not preceded by a meal and it woke her from sleep.    She notes that she had some cold-like symptoms recently that have improved. She had a productive cough that originally produced yellow-sputum, but has since produced clear sputum.    Of note, patient had an outpatient HIDA scan done by her general surgeon, the results of which are concerning for high grade common bile duct obstruction. At that point, the patient was told by her surgeon to go to the ED.    ROS  Review  "of Systems   All other systems reviewed and are negative.  12 point Review of Systems negative unless stated above     PMHx - HTN, dyslipidemia, obesity, T2DM not requiring insulin.  PSHx - ganglion cysts removed from wrist  Fam - Mother: A-fib, colon cancer / Father: 2-3 strokes, MI / Brother: cholecystectomy  Social - Tobacco never, EtOH rarely, Illicit denies    ED Course   Vitals - /64   Pulse 98   Temp 36.4 °C (97.5 °F) (Tympanic)   Resp 20   Wt 109 kg (240 lb)   SpO2 95%   Labs -   Lab Results   Component Value Date    WBC 11.1 01/11/2024    HGB 14.7 01/11/2024    HCT 46.3 (H) 01/11/2024    MCV 88 01/11/2024     01/11/2024     Lab Results   Component Value Date    GLUCOSE 159 (H) 01/11/2024    CALCIUM 9.6 01/11/2024     01/11/2024    K 3.8 01/11/2024    CO2 25 01/11/2024    CL 97 (L) 01/11/2024    BUN 21 01/11/2024    CREATININE 1.17 (H) 01/11/2024     Lab Results   Component Value Date    TROPHS 9 01/11/2024   No results found for: \"BNP\"No results found for: \"DDIMERVTE\"  Imaging -   CT abdomen pelvis w IV contrast   Final Result   1.  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 Further evaluation recommended including correlation with tumor   markers, and tissue sampling. Recommend GI and surgical consultation.   MRCP correlation could be considered.   2. Enlarged liver with probable steatosis.   3. Cholelithiasis.             Signed by: Kenyon Ortiz 1/11/2024 6:06 PM   Dictation workstation:   [id number]         Interventions -   Medications   morphine injection 4 mg (4 mg intravenous Not Given 1/11/24 6276)   piperacillin-tazobactam-dextrose (Zosyn) IV 4.5 g (4.5 g intravenous New Bag 1/11/24 1750)   sodium chloride 0.9% infusion (125 mL/hr intravenous New Bag 1/11/24 1750)   magnesium oxide (Mag-Ox) tablet 400 mg (has no administration in time range)   ondansetron (Zofran) " injection 4 mg (4 mg intravenous Given 1/11/24 1611)   sodium chloride 0.9 % bolus 1,000 mL (0 mL intravenous Stopped 1/11/24 1700)   iohexol (OMNIPaque) 350 mg iodine/mL solution 75 mL (75 mL intravenous Given 1/11/24 1650)       Past Medical History   History reviewed. No pertinent past medical history.     Surgical History     Past Surgical History:   Procedure Laterality Date    WRIST SURGERY         Family History   No family history on file.    Social History     Social History     Socioeconomic History    Marital status:      Spouse name: Not on file    Number of children: Not on file    Years of education: Not on file    Highest education level: Not on file   Occupational History    Not on file   Tobacco Use    Smoking status: Never    Smokeless tobacco: Never   Substance and Sexual Activity    Alcohol use: Never    Drug use: Never    Sexual activity: Not on file   Other Topics Concern    Not on file   Social History Narrative    Not on file     Social Determinants of Health     Financial Resource Strain: Not on file   Food Insecurity: Not on file   Transportation Needs: Not on file   Physical Activity: Not on file   Stress: Not on file   Social Connections: Not on file   Intimate Partner Violence: Not on file   Housing Stability: Not on file       Tobacco Use: Low Risk  (1/11/2024)    Patient History     Smoking Tobacco Use: Never     Smokeless Tobacco Use: Never     Passive Exposure: Not on file        Social History     Substance and Sexual Activity   Alcohol Use Never        Allergies     Allergies   Allergen Reactions    Amlodipine Swelling        Objective    Physical Exam  Constitutional:       General: She is not in acute distress.     Appearance: She is obese. She is not ill-appearing.   HENT:      Mouth/Throat:      Mouth: Mucous membranes are moist.      Pharynx: Oropharynx is clear.      Comments: Jaundice under the tongue.  Eyes:      Extraocular Movements: Extraocular movements intact.  "     Conjunctiva/sclera: Conjunctivae normal.      Pupils: Pupils are equal, round, and reactive to light.   Cardiovascular:      Rate and Rhythm: Normal rate and regular rhythm.      Pulses: Normal pulses.      Heart sounds: Normal heart sounds.   Pulmonary:      Effort: Pulmonary effort is normal.      Breath sounds: Normal breath sounds.      Comments: Mild cough.  Abdominal:      Palpations: Abdomen is soft.      Tenderness: There is abdominal tenderness in the right upper quadrant, epigastric area and left upper quadrant. Positive signs include Ibanez's sign.   Neurological:      Mental Status: She is alert.          Visit Vitals  /64   Pulse 98   Temp 36.4 °C (97.5 °F) (Tympanic)   Resp 20   Ht 1.702 m (5' 7\")   Wt 109 kg (240 lb)   SpO2 95%   BMI 37.59 kg/m²   Smoking Status Never   BSA 2.27 m²          Intake/Output Summary (Last 24 hours) at 1/11/2024 1850  Last data filed at 1/11/2024 1700  Gross per 24 hour   Intake 1000 ml   Output --   Net 1000 ml             Meds    Scheduled medications  magnesium oxide, 400 mg, oral, Once  morphine, 4 mg, intravenous, Once  piperacillin-tazobactam, 4.5 g, intravenous, q6h      Continuous medications  sodium chloride 0.9%, 125 mL/hr, Last Rate: 125 mL/hr (01/11/24 1750)      PRN medications       Labs:   Results from last 72 hours   Lab Units 01/11/24  1558   SODIUM mmol/L 138   POTASSIUM mmol/L 3.8   CHLORIDE mmol/L 97*   CO2 mmol/L 25   BUN mg/dL 21   CREATININE mg/dL 1.17*   GLUCOSE mg/dL 159*   CALCIUM mg/dL 9.6   ANION GAP mmol/L 20   EGFR mL/min/1.73m*2 50*      Results from last 72 hours   Lab Units 01/11/24  1558   WBC AUTO x10*3/uL 11.1   HEMOGLOBIN g/dL 14.7   HEMATOCRIT % 46.3*   PLATELETS AUTO x10*3/uL 305   NEUTROS PCT AUTO % 77.7   LYMPHS PCT AUTO % 11.5   MONOS PCT AUTO % 9.6   EOS PCT AUTO % 0.4      Lab Results   Component Value Date    CALCIUM 9.6 01/11/2024      Lab Results   Component Value Date    CRP 5.55 (H) 01/11/2024       " "[unfilled]     Micro/ID:   No results found for the last 90 days.                   No lab exists for component: \"AGALPCRNB\"   .ID  No results found for: \"URINECULTURE\", \"BLOODCULT\", \"CSFCULTSMEAR\"    Images    CT abdomen pelvis w IV contrast    Result Date: 1/11/2024  Interpreted By:  Kenyon Ortiz, STUDY: CT ABDOMEN PELVIS W IV CONTRAST;  1/11/2024 4:54 pm   INDICATION: Signs/Symptoms:Epigastric and left upper quadrant abdominal pain however patient had a nuclear medicine scan done just prior to arrival concerning for gallbladder involvement.   COMPARISON: January 11, 2024 nuclear medicine hepatobiliary scan. December 5, 2022 CT calcium score examination.   ACCESSION NUMBER(S): [id number]   ORDERING CLINICIAN: MARTHA TOUSSAINT   TECHNIQUE: CT of the abdomen and pelvis was performed.  Standard contiguous axial images were obtained at 3 mm slice thickness through the abdomen and pelvis. Coronal and sagittal reconstructions at 3 mm slice thickness were performed.   75 mL of Omnipaque 350 were administered intravenously without immediate complication.   FINDINGS: LOWER CHEST: The lung bases are clear. There is a small hiatal hernia.   ABDOMEN:   LIVER: The liver measures 20 cm length. The liver is hypoattenuating likely indicating steatosis. Normal morphology without detected mass. There are probably geographic areas of fat sparing adjacent to the gallbladder.   BILE DUCTS: There is dilation of the intra and extrahepatic bile ducts with abrupt tapering distal common bile duct. The mid extrahepatic bile duct measures 13 mm caliber coronal series 202, image 46.   There is polypoidal soft tissue density masslike abnormality extending from the ampullary region to the dilated portion of the distal common bile duct 3.0 x 1.7 x 2.1 cm coronal series 202, image 46, axial series 201, image 59. The finding most likely represents neoplasm such as a ampullary carcinoma versus periampullary carcinoma of biliary or pancreatic " origin or unusual subtype. The pattern would be atypical for obstructing debris. Further evaluation is recommended.   There is gas-filled structure along the posterior aspect of the abnormality medial to the descending segment of the duodenum series 201, image 56 most compatible with a duodenal diverticulum with potential tiny neck at the descending duodenum proximal to the ampulla although limited visualization of the neck.   GALLBLADDER: Decompressed. There are probably small gallstones.   PANCREAS: The pancreas is diffusely atrophic. No well-delineated significant pancreatic duct dilation.   SPLEEN: Within normal limits.   ADRENAL GLANDS: Within normal limits.   KIDNEYS AND URETERS: The kidneys are normal in size and enhance symmetrically.  No hydroureteronephrosis or nephroureterolithiasis is identified.   PELVIS:   BLADDER: Decompressed limiting the assessment without evident mass.   REPRODUCTIVE ORGANS: No pelvic mass.   BOWEL: The stomach and bowel are normal caliber without inflammatory change.   VESSELS: The principal central vessels are patent and normal caliber with trace atherosclerosis.   PERITONEUM/RETROPERITONEUM/LYMPH NODES: No ascites or free air, no fluid collection.  There is a prominent node 5.1 cm craniocaudal diameter along the posterior aspect of the superior pancreatic head anterior to the IVC and adjacent to the hepatic artery coronal series 202, image 50 with smaller adjacent nodes.   BONES AND ABDOMINAL WALL: No suspicious osseous lesions are identified. Degenerative discogenic disease is noted in the lower thoracic and lumbar spine. Unremarkable body wall.       1.  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 Further evaluation recommended including correlation with tumor markers, and tissue sampling. Recommend GI and surgical consultation. MRCP correlation could be considered. 2. Enlarged  liver with probable steatosis. 3. Cholelithiasis.     Signed by: Kenyon Ortiz 1/11/2024 6:06 PM Dictation workstation:   [id number]    NM hepatobiliary    Result Date: 1/11/2024  Interpreted By:  Jose Snyder and Maltbie Grace STUDY: NM HEPATOBILIARY;  1/11/2024 3:36 pm   INDICATION: Signs/Symptoms:Epigastric pain nausea.   COMPARISON: None.   ACCESSION NUMBER(S): [id number]   ORDERING CLINICIAN: AMRIK BRANDON   TECHNIQUE: DIVISION OF NUCLEAR MEDICINE HEPATOBILIARY SCAN (HIDA)   The patient received an intravenous dose of  5.0 mCi of Tc-99m mebrofenin (Choletec).  Sequential images of the upper abdomen were then acquired over the next 120 minutes.   FINDINGS: Persistent radiotracer retention in the liver parenchyma without excretion into the biliary tree or small bowel. The gallbladder was not visualized by 2 hours after radiotracer injection.       1. Persistent radiotracer retention in the liver parenchyma without excretion into biliary tree or small bowel. Findings are concerning for high-grade common bile duct obstruction, alternatively findings can be seen with severe liver dysfunction. Further evaluation with MRCP may be of added value.     Findings were communicated with Dr. Brandon By Dr. Moriah Jeffrey via phone at 3:30 pm on 1/11/2024 with read back verification. Patient was sent to the emergency department for further evaluation.   I personally reviewed the images/study and I agree with the findings as stated by Nuclear Medicine fellow Moriah Jeffrey MD. This study was interpreted at [place], [place].   MACRO: Critical Finding:  See findings. Notification was initiated on 1/11/2024 at 3:42 pm by  Moriah Jeffrey.  (**-OCF-**) Instructions:   Signed by: Jose Snyder 1/11/2024 3:48 PM Dictation workstation:   [id number]     No results found for this or any previous visit (from the past 4464 hour(s)).   No results found for this or any previous visit (from the  past 4464 hour(s)).   @CT@   [unfilled]   [unfilled]   === 01/11/24 ===    CT ABDOMEN PELVIS W IV CONTRAST    - Impression -  1.  Findings compatible with common bile duct obstruction likely due  to neoplasm such as a ampullary carcinoma, with large adjacent lymph  node. Further evaluation recommended including correlation with tumor  markers, and tissue sampling. Recommend GI and surgical consultation.  MRCP correlation could be considered.  2. Enlarged liver with probable steatosis.  3. Cholelithiasis.      Signed by: Kenyon Ortiz 1/11/2024 6:06 PM  Dictation workstation:   [id number]     Assessment and Plan    Rachele Major is a 73 y.o. female with pmhx significant for obesity and T2DM presenting for abdominal pain, vomiting, and a HIDA scan concerning for high grade common bile duct obstruction. CT scan concerning for common bile duct obstruction likely due to neoplasm. such as ampullary carcinoma with large adjacent lymph node.    Acute medical issues  #common bile duct obstruction  #c/f choledocholithiasis   #c/f ampullary carcinoma  #cholelithiasis  - CRP elevated, T. Bili 4.3  - CT: common bile duct obstruction likely due to neoplasm such as a ampullary carcinoma, with large adjacent lymph node  - gen surg and GI consulted, appreciate recs  - IV Zosyn 4.5g q6hr  - IV dilaudid prn pain  - Zofran prn nausea  - anticipating transfer to [facility] tomorrow for ERCP  - NPO after midnight, clear liquids now    #Acute Kidney Injury  - Baseline appears to be creatinine between 0.8 and 0.9. Now is 1.23 which is >.30 increase from baseline so meets criteria for MANDEEP  - Suspect related to dehydration/hypovolemia from reduced intake  - Encourage PO intake after procedure today if okay to eat otherwise likely maintenance fluids until then    # Hypokalemia - Replete  # Hypomagnesemia - Repleted. Improved    Chronic medical issues  HTN - hold losartan/hydrochlorothiazide 100-12.5 mg  GERD - Protonix  T2DM - hold home  metformin, start SSI    Fluids: IV LR 75cc/hr  Electrolytes: replete prn  Nutrition: Clear liquids, NPO after midnight  GI Ppx: Protonix  DVT Ppx: hold for anticipated morning procedure    Oxygenation: Room Air  Antibiotics: Zosyn (1/11 - )    Code Status: Full Code    Dispo: 72yo F presenting with acute ascending cholangitis 2/2 common bile duct obstruction. C/f neoplasm. Anticipate [facility].    James Mathews, DO  Internal Medicine PGY1

## 2024-01-12 ENCOUNTER — ANESTHESIA (OUTPATIENT)
Dept: GASTROENTEROLOGY | Facility: HOSPITAL | Age: 74
DRG: 418 | End: 2024-01-12
Payer: MEDICARE

## 2024-01-12 ENCOUNTER — HOSPITAL ENCOUNTER (OUTPATIENT)
Dept: GASTROENTEROLOGY | Facility: HOSPITAL | Age: 74
Discharge: HOME | DRG: 418 | End: 2024-01-12
Payer: MEDICARE

## 2024-01-12 ENCOUNTER — ANESTHESIA EVENT (OUTPATIENT)
Dept: GASTROENTEROLOGY | Facility: HOSPITAL | Age: 74
DRG: 418 | End: 2024-01-12
Payer: MEDICARE

## 2024-01-12 ENCOUNTER — HOSPITAL ENCOUNTER (OUTPATIENT)
Dept: RADIOLOGY | Facility: HOSPITAL | Age: 74
Discharge: HOME | DRG: 418 | End: 2024-01-12
Payer: MEDICARE

## 2024-01-12 VITALS
OXYGEN SATURATION: 98 % | RESPIRATION RATE: 18 BRPM | WEIGHT: 223 LBS | HEIGHT: 63 IN | HEART RATE: 80 BPM | TEMPERATURE: 97.3 F | DIASTOLIC BLOOD PRESSURE: 71 MMHG | SYSTOLIC BLOOD PRESSURE: 155 MMHG | BODY MASS INDEX: 39.51 KG/M2

## 2024-01-12 DIAGNOSIS — R93.89 ABNORMAL CT SCAN: ICD-10-CM

## 2024-01-12 DIAGNOSIS — K83.1 OBSTRUCTION OF BILE DUCT (CMS-HCC): ICD-10-CM

## 2024-01-12 DIAGNOSIS — R17 JAUNDICE: ICD-10-CM

## 2024-01-12 PROBLEM — E66.9 CLASS 2 OBESITY: Status: ACTIVE | Noted: 2023-12-20

## 2024-01-12 PROBLEM — K21.9 GASTROESOPHAGEAL REFLUX DISEASE: Status: ACTIVE | Noted: 2023-12-20

## 2024-01-12 PROBLEM — F32.A DEPRESSION: Status: ACTIVE | Noted: 2024-01-12

## 2024-01-12 PROBLEM — Z98.890 PONV (POSTOPERATIVE NAUSEA AND VOMITING): Status: ACTIVE | Noted: 2024-01-12

## 2024-01-12 PROBLEM — E11.9 TYPE 2 DIABETES MELLITUS (MULTI): Status: ACTIVE | Noted: 2022-09-23

## 2024-01-12 PROBLEM — E66.812 CLASS 2 OBESITY: Status: ACTIVE | Noted: 2023-12-20

## 2024-01-12 PROBLEM — N18.9 CHRONIC RENAL INSUFFICIENCY: Status: ACTIVE | Noted: 2024-01-12

## 2024-01-12 PROBLEM — R11.2 PONV (POSTOPERATIVE NAUSEA AND VOMITING): Status: ACTIVE | Noted: 2024-01-12

## 2024-01-12 LAB
ALBUMIN SERPL BCP-MCNC: 3.4 G/DL (ref 3.4–5)
ALP SERPL-CCNC: 165 U/L (ref 33–136)
ALT SERPL W P-5'-P-CCNC: 126 U/L (ref 7–45)
ANION GAP SERPL CALC-SCNC: 13 MMOL/L (ref 10–20)
AST SERPL W P-5'-P-CCNC: 135 U/L (ref 9–39)
ATRIAL RATE: 98 BPM
BILIRUB DIRECT SERPL-MCNC: 3.7 MG/DL (ref 0–0.3)
BILIRUB SERPL-MCNC: 5.9 MG/DL (ref 0–1.2)
BUN SERPL-MCNC: 20 MG/DL (ref 6–23)
CALCIUM SERPL-MCNC: 8.9 MG/DL (ref 8.6–10.3)
CHLORIDE SERPL-SCNC: 100 MMOL/L (ref 98–107)
CO2 SERPL-SCNC: 28 MMOL/L (ref 21–32)
CREAT SERPL-MCNC: 1.23 MG/DL (ref 0.5–1.05)
EGFRCR SERPLBLD CKD-EPI 2021: 47 ML/MIN/1.73M*2
ERYTHROCYTE [DISTWIDTH] IN BLOOD BY AUTOMATED COUNT: 12.7 % (ref 11.5–14.5)
GLUCOSE BLD MANUAL STRIP-MCNC: 123 MG/DL (ref 74–99)
GLUCOSE BLD MANUAL STRIP-MCNC: 128 MG/DL (ref 74–99)
GLUCOSE BLD MANUAL STRIP-MCNC: 135 MG/DL (ref 74–99)
GLUCOSE BLD MANUAL STRIP-MCNC: 153 MG/DL (ref 74–99)
GLUCOSE SERPL-MCNC: 127 MG/DL (ref 74–99)
HCT VFR BLD AUTO: 39.3 % (ref 36–46)
HGB BLD-MCNC: 12.6 G/DL (ref 12–16)
MAGNESIUM SERPL-MCNC: 1.64 MG/DL (ref 1.6–2.4)
MCH RBC QN AUTO: 27.9 PG (ref 26–34)
MCHC RBC AUTO-ENTMCNC: 32.1 G/DL (ref 32–36)
MCV RBC AUTO: 87 FL (ref 80–100)
NRBC BLD-RTO: 0 /100 WBCS (ref 0–0)
P AXIS: 42 DEGREES
P OFFSET: 190 MS
P ONSET: 162 MS
PHOSPHATE SERPL-MCNC: 3.2 MG/DL (ref 2.5–4.9)
PLATELET # BLD AUTO: 243 X10*3/UL (ref 150–450)
POTASSIUM SERPL-SCNC: 3.3 MMOL/L (ref 3.5–5.3)
PR INTERVAL: 120 MS
PROT SERPL-MCNC: 6.2 G/DL (ref 6.4–8.2)
Q ONSET: 222 MS
QRS COUNT: 16 BEATS
QRS DURATION: 80 MS
QT INTERVAL: 374 MS
QTC CALCULATION(BAZETT): 477 MS
QTC FREDERICIA: 440 MS
R AXIS: 17 DEGREES
RBC # BLD AUTO: 4.52 X10*6/UL (ref 4–5.2)
SODIUM SERPL-SCNC: 138 MMOL/L (ref 136–145)
T AXIS: -71 DEGREES
T OFFSET: 409 MS
VENTRICULAR RATE: 98 BPM
WBC # BLD AUTO: 6.8 X10*3/UL (ref 4.4–11.3)

## 2024-01-12 PROCEDURE — 2500000001 HC RX 250 WO HCPCS SELF ADMINISTERED DRUGS (ALT 637 FOR MEDICARE OP): Performed by: INTERNAL MEDICINE

## 2024-01-12 PROCEDURE — 1100000001 HC PRIVATE ROOM DAILY

## 2024-01-12 PROCEDURE — 2500000004 HC RX 250 GENERAL PHARMACY W/ HCPCS (ALT 636 FOR OP/ED): Performed by: ANESTHESIOLOGY

## 2024-01-12 PROCEDURE — 2500000004 HC RX 250 GENERAL PHARMACY W/ HCPCS (ALT 636 FOR OP/ED): Mod: MUE

## 2024-01-12 PROCEDURE — 2720000007 HC OR 272 NO HCPCS

## 2024-01-12 PROCEDURE — 84100 ASSAY OF PHOSPHORUS: CPT

## 2024-01-12 PROCEDURE — 3700000002 HC GENERAL ANESTHESIA TIME - EACH INCREMENTAL 1 MINUTE

## 2024-01-12 PROCEDURE — 94760 N-INVAS EAR/PLS OXIMETRY 1: CPT | Mod: MUE

## 2024-01-12 PROCEDURE — 80053 COMPREHEN METABOLIC PANEL: CPT

## 2024-01-12 PROCEDURE — 99100 ANES PT EXTEME AGE<1 YR&>70: CPT | Performed by: ANESTHESIOLOGY

## 2024-01-12 PROCEDURE — 85027 COMPLETE CBC AUTOMATED: CPT

## 2024-01-12 PROCEDURE — 83735 ASSAY OF MAGNESIUM: CPT

## 2024-01-12 PROCEDURE — 43264 ERCP REMOVE DUCT CALCULI: CPT | Performed by: INTERNAL MEDICINE

## 2024-01-12 PROCEDURE — 43259 EGD US EXAM DUODENUM/JEJUNUM: CPT | Performed by: INTERNAL MEDICINE

## 2024-01-12 PROCEDURE — 7100000001 HC RECOVERY ROOM TIME - INITIAL BASE CHARGE

## 2024-01-12 PROCEDURE — 82248 BILIRUBIN DIRECT: CPT | Performed by: PHYSICIAN ASSISTANT

## 2024-01-12 PROCEDURE — 2500000004 HC RX 250 GENERAL PHARMACY W/ HCPCS (ALT 636 FOR OP/ED)

## 2024-01-12 PROCEDURE — 36415 COLL VENOUS BLD VENIPUNCTURE: CPT | Performed by: PHYSICIAN ASSISTANT

## 2024-01-12 PROCEDURE — 99222 1ST HOSP IP/OBS MODERATE 55: CPT | Performed by: NURSE PRACTITIONER

## 2024-01-12 PROCEDURE — 99223 1ST HOSP IP/OBS HIGH 75: CPT | Performed by: INTERNAL MEDICINE

## 2024-01-12 PROCEDURE — 2550000001 HC RX 255 CONTRASTS: Performed by: INTERNAL MEDICINE

## 2024-01-12 PROCEDURE — 7100000009 HC PHASE TWO TIME - INITIAL BASE CHARGE

## 2024-01-12 PROCEDURE — 82947 ASSAY GLUCOSE BLOOD QUANT: CPT

## 2024-01-12 PROCEDURE — A43277 PR ERCP BALLOON DILATE BILIARY/PANC DUCT/AMPULLA EA

## 2024-01-12 PROCEDURE — 2500000005 HC RX 250 GENERAL PHARMACY W/O HCPCS: Performed by: ANESTHESIOLOGIST ASSISTANT

## 2024-01-12 PROCEDURE — 2500000004 HC RX 250 GENERAL PHARMACY W/ HCPCS (ALT 636 FOR OP/ED): Performed by: ANESTHESIOLOGIST ASSISTANT

## 2024-01-12 PROCEDURE — 7100000002 HC RECOVERY ROOM TIME - EACH INCREMENTAL 1 MINUTE

## 2024-01-12 PROCEDURE — 7100000010 HC PHASE TWO TIME - EACH INCREMENTAL 1 MINUTE

## 2024-01-12 PROCEDURE — 43273 ENDOSCOPIC PANCREATOSCOPY: CPT | Performed by: INTERNAL MEDICINE

## 2024-01-12 PROCEDURE — C1769 GUIDE WIRE: HCPCS

## 2024-01-12 PROCEDURE — 43262 ENDO CHOLANGIOPANCREATOGRAPH: CPT | Performed by: INTERNAL MEDICINE

## 2024-01-12 PROCEDURE — 99222 1ST HOSP IP/OBS MODERATE 55: CPT | Performed by: SURGERY

## 2024-01-12 PROCEDURE — 3700000001 HC GENERAL ANESTHESIA TIME - INITIAL BASE CHARGE

## 2024-01-12 PROCEDURE — 94760 N-INVAS EAR/PLS OXIMETRY 1: CPT

## 2024-01-12 PROCEDURE — A43277 PR ERCP BALLOON DILATE BILIARY/PANC DUCT/AMPULLA EA: Performed by: ANESTHESIOLOGY

## 2024-01-12 PROCEDURE — 2500000005 HC RX 250 GENERAL PHARMACY W/O HCPCS

## 2024-01-12 RX ORDER — NORETHINDRONE AND ETHINYL ESTRADIOL 0.5-0.035
KIT ORAL AS NEEDED
Status: DISCONTINUED | OUTPATIENT
Start: 2024-01-12 | End: 2024-01-12

## 2024-01-12 RX ORDER — METOCLOPRAMIDE HYDROCHLORIDE 5 MG/ML
10 INJECTION INTRAMUSCULAR; INTRAVENOUS ONCE
Status: COMPLETED | OUTPATIENT
Start: 2024-01-12 | End: 2024-01-12

## 2024-01-12 RX ORDER — FENTANYL CITRATE 50 UG/ML
INJECTION, SOLUTION INTRAMUSCULAR; INTRAVENOUS AS NEEDED
Status: DISCONTINUED | OUTPATIENT
Start: 2024-01-12 | End: 2024-01-12

## 2024-01-12 RX ORDER — SUCCINYLCHOLINE CHLORIDE 20 MG/ML
INJECTION INTRAMUSCULAR; INTRAVENOUS AS NEEDED
Status: DISCONTINUED | OUTPATIENT
Start: 2024-01-12 | End: 2024-01-12

## 2024-01-12 RX ORDER — POTASSIUM CHLORIDE 14.9 MG/ML
20 INJECTION INTRAVENOUS
Status: DISPENSED | OUTPATIENT
Start: 2024-01-12 | End: 2024-01-12

## 2024-01-12 RX ORDER — PROPOFOL 10 MG/ML
INJECTION, EMULSION INTRAVENOUS AS NEEDED
Status: DISCONTINUED | OUTPATIENT
Start: 2024-01-12 | End: 2024-01-12

## 2024-01-12 RX ORDER — MIDAZOLAM HYDROCHLORIDE 1 MG/ML
INJECTION, SOLUTION INTRAMUSCULAR; INTRAVENOUS AS NEEDED
Status: DISCONTINUED | OUTPATIENT
Start: 2024-01-12 | End: 2024-01-12

## 2024-01-12 RX ORDER — SODIUM CHLORIDE, SODIUM LACTATE, POTASSIUM CHLORIDE, CALCIUM CHLORIDE 600; 310; 30; 20 MG/100ML; MG/100ML; MG/100ML; MG/100ML
INJECTION, SOLUTION INTRAVENOUS CONTINUOUS PRN
Status: DISCONTINUED | OUTPATIENT
Start: 2024-01-12 | End: 2024-01-12

## 2024-01-12 RX ORDER — MAGNESIUM SULFATE HEPTAHYDRATE 40 MG/ML
2 INJECTION, SOLUTION INTRAVENOUS ONCE
Status: COMPLETED | OUTPATIENT
Start: 2024-01-12 | End: 2024-01-12

## 2024-01-12 RX ORDER — PHENYLEPHRINE HCL IN 0.9% NACL 1 MG/10 ML
SYRINGE (ML) INTRAVENOUS AS NEEDED
Status: DISCONTINUED | OUTPATIENT
Start: 2024-01-12 | End: 2024-01-12

## 2024-01-12 RX ORDER — ONDANSETRON HYDROCHLORIDE 2 MG/ML
INJECTION, SOLUTION INTRAVENOUS AS NEEDED
Status: DISCONTINUED | OUTPATIENT
Start: 2024-01-12 | End: 2024-01-12

## 2024-01-12 RX ORDER — ONDANSETRON HYDROCHLORIDE 2 MG/ML
4 INJECTION, SOLUTION INTRAVENOUS ONCE
Status: COMPLETED | OUTPATIENT
Start: 2024-01-12 | End: 2024-01-12

## 2024-01-12 RX ORDER — LIDOCAINE HYDROCHLORIDE 20 MG/ML
INJECTION, SOLUTION INFILTRATION; PERINEURAL AS NEEDED
Status: DISCONTINUED | OUTPATIENT
Start: 2024-01-12 | End: 2024-01-12

## 2024-01-12 RX ORDER — INDOMETHACIN 50 MG/1
SUPPOSITORY RECTAL AS NEEDED
Status: COMPLETED | OUTPATIENT
Start: 2024-01-12 | End: 2024-01-12

## 2024-01-12 RX ORDER — ROCURONIUM BROMIDE 10 MG/ML
INJECTION, SOLUTION INTRAVENOUS AS NEEDED
Status: DISCONTINUED | OUTPATIENT
Start: 2024-01-12 | End: 2024-01-12

## 2024-01-12 RX ADMIN — PROPOFOL 140 MG: 10 INJECTION, EMULSION INTRAVENOUS at 16:16

## 2024-01-12 RX ADMIN — Medication 200 MCG: at 16:29

## 2024-01-12 RX ADMIN — INDOMETHACIN 100 MG: 50 SUPPOSITORY RECTAL at 16:42

## 2024-01-12 RX ADMIN — PANTOPRAZOLE SODIUM 40 MG: 40 TABLET, DELAYED RELEASE ORAL at 06:31

## 2024-01-12 RX ADMIN — SUCCINYLCHOLINE CHLORIDE 140 MG: 20 INJECTION, SOLUTION INTRAMUSCULAR; INTRAVENOUS at 16:17

## 2024-01-12 RX ADMIN — PIPERACILLIN SODIUM AND TAZOBACTAM SODIUM 4.5 G: 4; .5 INJECTION, SOLUTION INTRAVENOUS at 22:58

## 2024-01-12 RX ADMIN — PIPERACILLIN SODIUM AND TAZOBACTAM SODIUM 4.5 G: 4; .5 INJECTION, SOLUTION INTRAVENOUS at 05:24

## 2024-01-12 RX ADMIN — SODIUM CHLORIDE, SODIUM LACTATE, POTASSIUM CHLORIDE, AND CALCIUM CHLORIDE 500 ML: 600; 310; 30; 20 INJECTION, SOLUTION INTRAVENOUS at 19:00

## 2024-01-12 RX ADMIN — EPHEDRINE SULFATE 5 MG: 50 INJECTION INTRAVENOUS at 16:36

## 2024-01-12 RX ADMIN — ROCURONIUM BROMIDE 30 MG: 10 INJECTION, SOLUTION INTRAVENOUS at 16:32

## 2024-01-12 RX ADMIN — SODIUM CHLORIDE, POTASSIUM CHLORIDE, SODIUM LACTATE AND CALCIUM CHLORIDE: 600; 310; 30; 20 INJECTION, SOLUTION INTRAVENOUS at 15:55

## 2024-01-12 RX ADMIN — SODIUM CHLORIDE, POTASSIUM CHLORIDE, SODIUM LACTATE AND CALCIUM CHLORIDE 75 ML/HR: 600; 310; 30; 20 INJECTION, SOLUTION INTRAVENOUS at 21:38

## 2024-01-12 RX ADMIN — MIDAZOLAM HYDROCHLORIDE 1 MG: 1 INJECTION INTRAMUSCULAR; INTRAVENOUS at 16:03

## 2024-01-12 RX ADMIN — SUGAMMADEX 200 MG: 100 INJECTION, SOLUTION INTRAVENOUS at 17:03

## 2024-01-12 RX ADMIN — EPHEDRINE SULFATE 10 MG: 50 INJECTION INTRAVENOUS at 16:38

## 2024-01-12 RX ADMIN — IOHEXOL 10 ML: 300 INJECTION, SOLUTION INTRAVENOUS at 16:59

## 2024-01-12 RX ADMIN — LIDOCAINE HYDROCHLORIDE 100 MG: 20 INJECTION, SOLUTION INFILTRATION; PERINEURAL at 16:16

## 2024-01-12 RX ADMIN — POTASSIUM CHLORIDE 20 MEQ: 14.9 INJECTION, SOLUTION INTRAVENOUS at 09:31

## 2024-01-12 RX ADMIN — MIDAZOLAM HYDROCHLORIDE 1 MG: 1 INJECTION INTRAMUSCULAR; INTRAVENOUS at 15:58

## 2024-01-12 RX ADMIN — SUGAMMADEX 200 MG: 100 INJECTION, SOLUTION INTRAVENOUS at 17:06

## 2024-01-12 RX ADMIN — ONDANSETRON 4 MG: 2 INJECTION, SOLUTION INTRAMUSCULAR; INTRAVENOUS at 17:30

## 2024-01-12 RX ADMIN — PROPOFOL 60 MG: 10 INJECTION, EMULSION INTRAVENOUS at 16:31

## 2024-01-12 RX ADMIN — FENTANYL CITRATE 50 MCG: 50 INJECTION, SOLUTION INTRAMUSCULAR; INTRAVENOUS at 16:32

## 2024-01-12 RX ADMIN — FENTANYL CITRATE 50 MCG: 50 INJECTION, SOLUTION INTRAMUSCULAR; INTRAVENOUS at 16:10

## 2024-01-12 RX ADMIN — IOHEXOL 10 ML: 300 INJECTION, SOLUTION INTRAVENOUS at 15:54

## 2024-01-12 RX ADMIN — MAGNESIUM SULFATE HEPTAHYDRATE 2 G: 2 INJECTION, SOLUTION INTRAVENOUS at 09:31

## 2024-01-12 RX ADMIN — METOCLOPRAMIDE 10 MG: 5 INJECTION, SOLUTION INTRAMUSCULAR; INTRAVENOUS at 18:45

## 2024-01-12 RX ADMIN — ONDANSETRON 4 MG: 2 INJECTION, SOLUTION INTRAMUSCULAR; INTRAVENOUS at 17:06

## 2024-01-12 ASSESSMENT — PAIN SCALES - GENERAL
PAINLEVEL_OUTOF10: 0 - NO PAIN
PAIN_LEVEL: 0
PAINLEVEL_OUTOF10: 0 - NO PAIN

## 2024-01-12 ASSESSMENT — PAIN - FUNCTIONAL ASSESSMENT
PAIN_FUNCTIONAL_ASSESSMENT: 0-10

## 2024-01-12 ASSESSMENT — COGNITIVE AND FUNCTIONAL STATUS - GENERAL
CLIMB 3 TO 5 STEPS WITH RAILING: A LITTLE
WALKING IN HOSPITAL ROOM: A LITTLE
MOBILITY SCORE: 22
DAILY ACTIVITIY SCORE: 24

## 2024-01-12 ASSESSMENT — COLUMBIA-SUICIDE SEVERITY RATING SCALE - C-SSRS
6. HAVE YOU EVER DONE ANYTHING, STARTED TO DO ANYTHING, OR PREPARED TO DO ANYTHING TO END YOUR LIFE?: NO
2. HAVE YOU ACTUALLY HAD ANY THOUGHTS OF KILLING YOURSELF?: NO
1. IN THE PAST MONTH, HAVE YOU WISHED YOU WERE DEAD OR WISHED YOU COULD GO TO SLEEP AND NOT WAKE UP?: NO

## 2024-01-12 NOTE — H&P
"History Of Present Illness  Rachele Major is a 73 y.o. female presenting with jaundice.    Increasing upper abdominal pain, +N/V.  Imaging concerning for ampullary lesion.      Plan for EUS and ERCP.      Past Medical History  Past Medical History:   Diagnosis Date    GERD (gastroesophageal reflux disease)     HTN (hypertension)        Surgical History  Past Surgical History:   Procedure Laterality Date    WRIST SURGERY          Social History  She reports that she has never smoked. She has never used smokeless tobacco. She reports that she does not drink alcohol and does not use drugs.    Family History  No family history on file.     Allergies  Amlodipine    Review of Systems     Physical Exam     Last Recorded Vitals  Blood pressure 118/54, pulse 78, temperature 36 °C (96.8 °F), temperature source Temporal, resp. rate 17, height 1.6 m (5' 3\"), weight 101 kg (223 lb), SpO2 95 %.    Relevant Results       Assessment/Plan   Proceed with EUS and ERCP.        I spent 5 minutes in the professional and overall care of this patient.      Juany Espinosa MD    "

## 2024-01-12 NOTE — ANESTHESIA PROCEDURE NOTES
Airway  Date/Time: 1/12/2024 4:19 PM  Urgency: elective    Airway not difficult    Staffing  Performed: CRNA   Authorized by: Cristian Alcantara MD    Performed by: MICHAEL Muller-JANI  Patient location during procedure: OR    Indications and Patient Condition  Indications for airway management: anesthesia  Spontaneous Ventilation: absent  Sedation level: deep  Preoxygenated: yes  Patient position: sniffing  MILS maintained throughout  Mask difficulty assessment: 0 - not attempted  No planned trial extubation    Final Airway Details  Final airway type: endotracheal airway      Successful airway: ETT  Cuffed: yes   Successful intubation technique: direct laryngoscopy  Facilitating devices/methods: intubating stylet  Endotracheal tube insertion site: oral  Blade: Amrina  Blade size: #3  ETT size (mm): 7.0  Cormack-Lehane Classification: grade IIa - partial view of glottis  Placement verified by: chest auscultation and capnometry   Cuff volume (mL): 8  Measured from: lips  ETT to lips (cm): 21  Number of attempts at approach: 1  Number of other approaches attempted: 0

## 2024-01-12 NOTE — H&P (VIEW-ONLY)
GENERAL SURGERY/TRAUMA  HISTORY AND PHYSICAL/CONSULT    Date: 1/12/2024 Time: 1:37 PM    Name: Rachele Major  MRN: [id number]    This is a 73 y.o. female with one year history of abdominal pain and 3 months of progressively worsening vomiting after eating. A HIDA scan was done yesterday outpatient and she was found to have no filling of the gallbladder or extrahepatic biliary tree consistent with CBD obstruction. She was sent to the [facility] ER for further management. In the ER she was found to be jaundiced with a Tbili of 4.3 and Dbili of 2.6 as well as transaminitis. A CT scan showed intra and extrahepatic biliary duct dilation and a 3 cm mass at the ampulla, as well as small gallstones and a decompressed gallbladder. These findings are highly suspicious for obstructing malignancy.     She was admitted, started on IV abx, and arrangements were made for ERCP today. Her Bilirubin continues to climb today. She was made NPO and denies abdominal pain and n/v since she hasn't eaten.       PAST MEDICAL HISTORY:  Past Medical History:   Diagnosis Date    GERD (gastroesophageal reflux disease)     HTN (hypertension)         PAST SURGICAL HISTORY:  Past Surgical History:   Procedure Laterality Date    WRIST SURGERY     No prior abdominal surgeries.     FAMILY HISTORY:  No family history on file.     SOCIAL HISTORY:  Social History     Tobacco Use    Smoking status: Never    Smokeless tobacco: Never   Vaping Use    Vaping Use: Never used   Substance Use Topics    Alcohol use: Never    Drug use: Never       MEDICATIONS:  Prior to Admission Medications:    Current Facility-Administered Medications:     [Held by provider] aspirin EC tablet 81 mg, 81 mg, oral, Daily, James Mathews DO    [MAR Hold - Suspended Admission] dextrose 10 % in water (D10W) infusion, 0.3 g/kg/hr, intravenous, Once PRN, James Mathews DO    [MAR Hold - Suspended Admission] dextrose 50 % injection 25 g, 25 g, intravenous, q15 min PRN, James Mathews  DO    [MAR Hold - Suspended Admission] glucagon (Glucagen) injection 1 mg, 1 mg, intramuscular, q15 min PRN, James Mathews DO    [Held by provider] hydroCHLOROthiazide (HYDRODiuril) tablet 12.5 mg, 12.5 mg, oral, Daily, James Mathews DO    [MAR Hold - Suspended Admission] HYDROmorphone (Dilaudid) injection 0.2 mg, 0.2 mg, intravenous, q3h PRN, James Mathews DO    [MAR Hold - Suspended Admission] HYDROmorphone (Dilaudid) injection 0.4 mg, 0.4 mg, intravenous, q3h PRN, James Mathews DO    [MAR Hold - Suspended Admission] insulin lispro (HumaLOG) injection 0-5 Units, 0-5 Units, subcutaneous, q4h, James Mathews DO    [MAR Hold - Suspended Admission] lactated Ringer's infusion, 75 mL/hr, intravenous, Continuous, James Mathews DO, Last Rate: 75 mL/hr at 01/12/24 0537, 75 mL/hr at 01/12/24 0537    [Held by provider] losartan (Cozaar) tablet 100 mg, 100 mg, oral, Daily, James Mathews DO    [MAR Hold - Suspended Admission] ondansetron (Zofran) tablet 4 mg, 4 mg, oral, q8h PRN **OR** [MAR Hold - Suspended Admission] ondansetron (Zofran) injection 4 mg, 4 mg, intravenous, q8h PRN, James Mathews DO    [MAR Hold - Suspended Admission] pantoprazole (ProtoNix) EC tablet 40 mg, 40 mg, oral, Daily before breakfast, James Mathews DO, 40 mg at 01/12/24 0631    [MAR Hold - Suspended Admission] piperacillin-tazobactam-dextrose (Zosyn) IV 4.5 g, 4.5 g, intravenous, q6h, James Mathews DO, Stopped at 01/12/24 0554    Current Outpatient Medications:     aspirin 81 mg EC tablet, Take 1 tablet (81 mg) by mouth once daily., Disp: , Rfl:     ergocalciferol (Vitamin D-2) 1.25 MG (20582 UT) capsule, Take 1 capsule (50,000 Units) by mouth once a week., Disp: , Rfl:     losartan (Cozaar) 2.5 mg/mL oral suspension, , Disp: , Rfl:     metFORMIN (Glucophage) 500 mg tablet, Take 1 tablet (500 mg) by mouth 2 times a day with meals., Disp: , Rfl:     omeprazole (PriLOSEC) 20 mg DR capsule, 1 capsule (20 mg)., Disp: , Rfl:      ALLERGIES:  Allergies   Allergen Reactions    Amlodipine Swelling       REVIEW OF SYSTEMS:  GENERAL: Negative for malaise, significant weight loss and fever  NECK: Negative for lumps, goiter, pain and significant neck swelling  RESPIRATORY: Negative for cough, wheezing or shortness of breath.  CARDIOVASCULAR: Negative for chest pain, leg swelling or palpitations.  GI: chronic abdominal pain, N/V  : No history of dysuria, frequency or incontinence  MUSCULOSKELETAL: Negative for joint pain or swelling, back pain or muscle pain.  SKIN: Negative for lesions, rash, and itching.  PSYCH: Negative for sleep disturbance, mood disorder and recent psychosocial stressors.  ENDOCRINE: Negative for cold or heat intolerance, polyuria, polydipsia and goiter.    PHYSICAL EXAM:  Vitals:    01/12/24 1019   BP: 120/71   Pulse: 65   Resp: 18   Temp: 36.2 °C (97.2 °F)   SpO2: 96%     General appearance: obese, appears younger than chronological age  Skin: warm, no erythema or rashes  Lungs: clear to percussion and auscultation  Heart: regular rhythm and S1, S2 normal  Abdomen: soft, distended, nontender  Extremities: Normal exam of the extremities. No swelling or pain.    IMPRESSION:  Rachele Major is a 73 y.o. female with CBD obstruction, likely due to malignancy. She is currently undergoing an ERCP.    PLAN:  I discussed with her that before planning any surgical intervention, we would need the results of today's ERCP. She understands. I explained to her that if it is just an obstruction due to gallstones, she should have her gallbladder removed, but that could be done electively outpatient or this admission. However, if a malignancy is found, that would entail further work up and referral to a [facility] surgeon.    I will sign out this patient to Dr. Lieberman's care for the weekend.     Aneudy Flores MD  Bariatric and Minimally Invasive General Surgery

## 2024-01-12 NOTE — PROGRESS NOTES
"Overnight Events     Nothing acute overnight.    Subjective   Patient reports that her pain and nausea are well controlled at this time. I discussed the CT findings with the patient and she anticipates going for ERCP today.    Objective    Physical Exam  Constitutional:       Appearance: Normal appearance. She is obese.   HENT:      Mouth/Throat:      Mouth: Mucous membranes are moist.      Pharynx: Oropharynx is clear.      Comments: Jaundice under the tongue.  Cardiovascular:      Rate and Rhythm: Normal rate and regular rhythm.      Pulses: Normal pulses.      Heart sounds: Normal heart sounds.   Pulmonary:      Effort: Pulmonary effort is normal.      Breath sounds: Normal breath sounds.   Abdominal:      Palpations: Abdomen is soft.      Tenderness: There is abdominal tenderness in the right upper quadrant, epigastric area and left upper quadrant. Positive signs include Ibanez's sign.   Skin:     General: Skin is warm and dry.   Neurological:      Mental Status: She is alert.   Psychiatric:         Mood and Affect: Mood normal.         Behavior: Behavior normal.          Visit Vitals  /71 (BP Location: Left arm, Patient Position: Lying)   Pulse 65   Temp 36.2 °C (97.2 °F)   Resp 18   Ht 1.727 m (5' 8\")   Wt 101 kg (223 lb)   SpO2 96%   BMI 33.91 kg/m²   Smoking Status Never   BSA 2.2 m²          Intake/Output Summary (Last 24 hours) at 1/12/2024 1103  Last data filed at 1/12/2024 0554  Gross per 24 hour   Intake 2218.75 ml   Output --   Net 2218.75 ml           I/Os    Intake/Output Summary (Last 24 hours) at 1/12/2024 1103  Last data filed at 1/12/2024 0554  Gross per 24 hour   Intake 2218.75 ml   Output --   Net 2218.75 ml       Labs:   Results from last 72 hours   Lab Units 01/12/24  0640 01/11/24  1558   SODIUM mmol/L 138 138   POTASSIUM mmol/L 3.3* 3.8   CHLORIDE mmol/L 100 97*   CO2 mmol/L 28 25   BUN mg/dL 20 21   CREATININE mg/dL 1.23* 1.17*   GLUCOSE mg/dL 127* 159*   CALCIUM mg/dL 8.9 9.6 " "  ANION GAP mmol/L 13 20   EGFR mL/min/1.73m*2 47* 50*   PHOSPHORUS mg/dL 3.2  --       Results from last 72 hours   Lab Units 01/12/24  0640 01/11/24  1558   WBC AUTO x10*3/uL 6.8 11.1   HEMOGLOBIN g/dL 12.6 14.7   HEMATOCRIT % 39.3 46.3*   PLATELETS AUTO x10*3/uL 243 305   NEUTROS PCT AUTO %  --  77.7   LYMPHS PCT AUTO %  --  11.5   MONOS PCT AUTO %  --  9.6   EOS PCT AUTO %  --  0.4      Lab Results   Component Value Date    CALCIUM 8.9 01/12/2024    PHOS 3.2 01/12/2024      Lab Results   Component Value Date    CRP 5.55 (H) 01/11/2024       [unfilled]     Micro/ID:   No results found for the last 90 days.                   No lab exists for component: \"AGALPCRNB\"   .ID  Lab Results   Component Value Date    BLOODCULT Loaded on Instrument - Culture in progress 01/11/2024    BLOODCULT Loaded on Instrument - Culture in progress 01/11/2024       Meds    Scheduled medications  [Held by provider] aspirin, 81 mg, oral, Daily  [Held by provider] hydroCHLOROthiazide, 12.5 mg, oral, Daily  [MAR Hold - Suspended Admission] insulin lispro, 0-5 Units, subcutaneous, q4h  [Held by provider] losartan, 100 mg, oral, Daily  magnesium sulfate, 2 g, intravenous, Once  [MAR Hold - Suspended Admission] pantoprazole, 40 mg, oral, Daily before breakfast  [MAR Hold - Suspended Admission] piperacillin-tazobactam, 4.5 g, intravenous, q6h  potassium chloride, 20 mEq, intravenous, q2h      Continuous medications  [MAR Hold - Suspended Admission] lactated Ringer's, 75 mL/hr, Last Rate: 75 mL/hr (01/12/24 0537)      PRN medications  PRN medications: [MAR Hold - Suspended Admission] dextrose 10 % in water (D10W), [MAR Hold - Suspended Admission] dextrose, [MAR Hold - Suspended Admission] glucagon, [MAR Hold - Suspended Admission] HYDROmorphone, [MAR Hold - Suspended Admission] HYDROmorphone, [MAR Hold - Suspended Admission] ondansetron **OR** [MAR Hold - Suspended Admission] ondansetron       Images    CT abdomen pelvis w IV " contrast    Result Date: 1/11/2024  Interpreted By:  Kenyon Ortiz, STUDY: CT ABDOMEN PELVIS W IV CONTRAST;  1/11/2024 4:54 pm   INDICATION: Signs/Symptoms:Epigastric and left upper quadrant abdominal pain however patient had a nuclear medicine scan done just prior to arrival concerning for gallbladder involvement.   COMPARISON: January 11, 2024 nuclear medicine hepatobiliary scan. December 5, 2022 CT calcium score examination.   ACCESSION NUMBER(S): [id number]   ORDERING CLINICIAN: MARTHA TOUSSAINT   TECHNIQUE: CT of the abdomen and pelvis was performed.  Standard contiguous axial images were obtained at 3 mm slice thickness through the abdomen and pelvis. Coronal and sagittal reconstructions at 3 mm slice thickness were performed.   75 mL of Omnipaque 350 were administered intravenously without immediate complication.   FINDINGS: LOWER CHEST: The lung bases are clear. There is a small hiatal hernia.   ABDOMEN:   LIVER: The liver measures 20 cm length. The liver is hypoattenuating likely indicating steatosis. Normal morphology without detected mass. There are probably geographic areas of fat sparing adjacent to the gallbladder.   BILE DUCTS: There is dilation of the intra and extrahepatic bile ducts with abrupt tapering distal common bile duct. The mid extrahepatic bile duct measures 13 mm caliber coronal series 202, image 46.   There is polypoidal soft tissue density masslike abnormality extending from the ampullary region to the dilated portion of the distal common bile duct 3.0 x 1.7 x 2.1 cm coronal series 202, image 46, axial series 201, image 59. 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 The pattern would be atypical for obstructing debris. Further evaluation is recommended.   There is gas-filled structure along the posterior aspect of the abnormality medial to the descending segment of the  duodenum series 201, image 56 most compatible with a duodenal diverticulum with potential tiny neck at the descending duodenum proximal to the ampulla although limited visualization of the neck.   GALLBLADDER: Decompressed. There are probably small gallstones.   PANCREAS: The pancreas is diffusely atrophic. No well-delineated significant pancreatic duct dilation.   SPLEEN: Within normal limits.   ADRENAL GLANDS: Within normal limits.   KIDNEYS AND URETERS: The kidneys are normal in size and enhance symmetrically.  No hydroureteronephrosis or nephroureterolithiasis is identified.   PELVIS:   BLADDER: Decompressed limiting the assessment without evident mass.   REPRODUCTIVE ORGANS: No pelvic mass.   BOWEL: The stomach and bowel are normal caliber without inflammatory change.   VESSELS: The principal central vessels are patent and normal caliber with trace atherosclerosis.   PERITONEUM/RETROPERITONEUM/LYMPH NODES: No ascites or free air, no fluid collection.  There is a prominent node 5.1 cm craniocaudal diameter along the posterior aspect of the superior pancreatic head anterior to the IVC and adjacent to the hepatic artery coronal series 202, image 50 with smaller adjacent nodes.   BONES AND ABDOMINAL WALL: No suspicious osseous lesions are identified. Degenerative discogenic disease is noted in the lower thoracic and lumbar spine. Unremarkable body wall.       1.  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 Further evaluation recommended including correlation with tumor markers, and tissue sampling. Recommend GI and surgical consultation. MRCP correlation could be considered. 2. Enlarged liver with probable steatosis. 3. Cholelithiasis.     Signed by: Kenyon Ortiz 1/11/2024 6:06 PM Dictation workstation:   [id number]    NM hepatobiliary    Result Date: 1/11/2024  Interpreted By:  Jose Snyder and Maltbie Grace STUDY:  NM HEPATOBILIARY;  1/11/2024 3:36 pm   INDICATION: Signs/Symptoms:Epigastric pain nausea.   COMPARISON: None.   ACCESSION NUMBER(S): [id number]   ORDERING CLINICIAN: AMRIK BRANDON   TECHNIQUE: DIVISION OF NUCLEAR MEDICINE HEPATOBILIARY SCAN (\[facility] [place] [facility]\""A)   The patient received an intravenous dose of  5.0 mCi of Tc-99m mebrofenin (Choletec).  Sequential images of the upper abdomen were then acquired over the next 120 minutes.   FINDINGS: Persistent radiotracer retention in the liver parenchyma without excretion into the biliary tree or small bowel. The gallbladder was not visualized by 2 hours after radiotracer injection.       1. Persistent radiotracer retention in the liver parenchyma without excretion into biliary tree or small bowel. Findings are concerning for high-grade common bile duct obstruction, alternatively findings can be seen with severe liver dysfunction. Further evaluation with MRCP may be of added value.     Findings were communicated with Dr. Brandon By Dr. Moriah Jeffrey via phone at 3:30 pm on 1/11/2024 with read back verification. Patient was sent to the emergency department for further evaluation.   I personally reviewed the images/study and I agree with the findings as stated by Nuclear Medicine fellow Moriah Jeffrey MD. This study was interpreted at [facility], [place], [place].   MACRO: Critical Finding:  See findings. Notification was initiated on 1/11/2024 at 3:42 pm by  Moriah Jeffrey.  (**-OCF-**) Instructions:   Signed by: Jose Snyder 1/11/2024 3:48 PM Dictation workstation:   [id number]     No results found for this or any previous visit (from the past 4464 hour(s)).   No results found for this or any previous visit (from the past 4464 hour(s)).   @CT@   [unfilled]   [unfilled]   === 01/11/24 ===    CT ABDOMEN PELVIS W IV CONTRAST    - Impression -  1.  Findings compatible with common bile duct obstruction likely due  to neoplasm such as a ampullary carcinoma, with  large adjacent lymph  node. Further evaluation recommended including correlation with tumor  markers, and tissue sampling. Recommend GI and surgical consultation.  MRCP correlation could be considered.  2. Enlarged liver with probable steatosis.  3. Cholelithiasis.      Signed by: Kenyon Ortiz 1/11/2024 6:06 PM  Dictation workstation:   [id number]     Assessment and Plan    Rachele Major is a 73 y.o. female with pmhx significant for obesity and T2DM presenting for abdominal pain, vomiting, and a HIDA scan concerning for high grade common bile duct obstruction. CT scan concerning for common bile duct obstruction likely due to neoplasm.     Acute medical issues  #common bile duct obstruction  #c/f choledocholithiasis   #c/f ampullary carcinoma  #cholelithiasis  - CRP elevated, T. 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 Now is 1.23 which is >.30 increase from baseline so meets criteria for MANDEEP  - Suspect related to dehydration/hypovolemia from reduced intake  - Encourage PO intake after procedure today if okay to eat otherwise likely maintenance fluids until then     # Hypokalemia - Replete  # Hypomagnesemia - Repleted. Improved     Chronic medical issues  HTN - hold losartan/hydrochlorothiazide 100-12.5 mg  GERD - Protonix  T2DM - hold home metformin, start SSI     Fluids: IV LR 75cc/hr  Electrolytes: replete prn  Nutrition: NPO for procedure  GI Ppx: Protonix  DVT Ppx: hold for anticipated procedure     Oxygenation: Room Air  Antibiotics: Zosyn (1/11 - )     Code Status: Full Code     Dispo: 74yo F presenting with acute ascending cholangitis 2/2 common bile duct  obstruction. C/f neoplasm. Anticipate ERCP at [facility].    James Mathews, DO  Internal Medicine PGY1

## 2024-01-12 NOTE — ANESTHESIA PREPROCEDURE EVALUATION
Patient: Rachele Major    Procedure Information       Date/Time: 01/12/24 1200    Scheduled providers: Juany Espinosa MD; Ivania Cazares MD; Amber Lombardo, RN    Procedure: ERCP    Location: [facility]                                                                                           Pre-Anesthesia Evaluation    Rachele Major is a 73 y.o. female presents for procedure stated above. She is admitted to the hospital for Common bile duct (CBD) obstruction [K83.1]. Today is Hospital Day: 1    Past Medical History:   Diagnosis Date    GERD (gastroesophageal reflux disease)     HTN (hypertension)          Surgical History reviewed  Past Surgical History:   Procedure Laterality Date    WRIST SURGERY         Social History reviewed  She reports that she has never smoked. She has never used smokeless tobacco. She reports that she does not drink alcohol and does not use drugs.    Allergies and Medications reviewed  Amlodipine      Current Outpatient Medications:     aspirin 81 mg EC tablet, Take 1 tablet (81 mg) by mouth once daily., Disp: , Rfl:     ergocalciferol (Vitamin D-2) 1.25 MG (12360 UT) capsule, Take 1 capsule (50,000 Units) by mouth once a week., Disp: , Rfl:     losartan (Cozaar) 2.5 mg/mL oral suspension, , Disp: , Rfl:     metFORMIN (Glucophage) 500 mg tablet, Take 1 tablet (500 mg) by mouth 2 times a day with meals., Disp: , Rfl:     omeprazole (PriLOSEC) 20 mg DR capsule, 1 capsule (20 mg)., Disp: , Rfl:   No current facility-administered medications for this encounter.    Facility-Administered Medications Ordered in Other Encounters:     [Held by provider] aspirin EC tablet 81 mg, 81 mg, oral, Daily, James Mathews DO    [MAR Hold - Suspended Admission] dextrose 10 % in water (D10W) infusion, 0.3 g/kg/hr, intravenous, Once PRN, James Mathews DO    [MAR Hold - Suspended Admission] dextrose 50 % injection 25 g, 25 g, intravenous, q15 min PRN, James Mathews DO    [MAR Hold -  Suspended Admission] glucagon (Glucagen) injection 1 mg, 1 mg, intramuscular, q15 min PRN, James Mathews DO    [Held by provider] hydroCHLOROthiazide (HYDRODiuril) tablet 12.5 mg, 12.5 mg, oral, Daily, James Mathews DO    [MAR Hold - Suspended Admission] HYDROmorphone (Dilaudid) injection 0.2 mg, 0.2 mg, intravenous, q3h PRN, James Mathews DO    [MAR Hold - Suspended Admission] HYDROmorphone (Dilaudid) injection 0.4 mg, 0.4 mg, intravenous, q3h PRN, James Mathews DO    [MAR Hold - Suspended Admission] insulin lispro (HumaLOG) injection 0-5 Units, 0-5 Units, subcutaneous, q4h, James Mathews DO    [MAR Hold - Suspended Admission] lactated Ringer's infusion, 75 mL/hr, intravenous, Continuous, James Mathews DO, Last Rate: 75 mL/hr at 01/12/24 0537, 75 mL/hr at 01/12/24 0537    [Held by provider] losartan (Cozaar) tablet 100 mg, 100 mg, oral, Daily, James Mathews DO    [MAR Hold - Suspended Admission] ondansetron (Zofran) tablet 4 mg, 4 mg, oral, q8h PRN **OR** [MAR Hold - Suspended Admission] ondansetron (Zofran) injection 4 mg, 4 mg, intravenous, q8h PRN, James Mathews DO    [MAR Hold - Suspended Admission] pantoprazole (ProtoNix) EC tablet 40 mg, 40 mg, oral, Daily before breakfast, James Mathews DO, 40 mg at 01/12/24 0631    [MAR Hold - Suspended Admission] piperacillin-tazobactam-dextrose (Zosyn) IV 4.5 g, 4.5 g, intravenous, q6h, James Mathews DO, Stopped at 01/12/24 0554    Prior to Admission medications    Medication Sig Start Date End Date Taking? Authorizing Provider   aspirin 81 mg EC tablet Take 1 tablet (81 mg) by mouth once daily.    Historical Provider, MD   ergocalciferol (Vitamin D-2) 1.25 MG (49581 UT) capsule Take 1 capsule (50,000 Units) by mouth once a week. 12/19/23   Historical Provider, MD   losartan (Cozaar) 2.5 mg/mL oral suspension     Historical Provider, MD   metFORMIN (Glucophage) 500 mg tablet Take 1 tablet (500 mg) by mouth 2 times a day with meals. 12/19/23    "Historical Provider, MD   omeprazole (PriLOSEC) 20 mg DR capsule 1 capsule (20 mg). 12/14/23   Historical Provider, MD   metroNIDAZOLE (Metrocream) 0.75 % cream 1 application 5/27/22 1/11/24  Historical Provider, MD         Relevant Results reviewed      No results found for: \"ABORH\"    Results from last 7 days   Lab Units 01/12/24  0640 01/11/24  1558   WBC AUTO x10*3/uL 6.8 11.1   HEMOGLOBIN g/dL 12.6 14.7   HEMATOCRIT % 39.3 46.3*   PLATELETS AUTO x10*3/uL 243 305     Results from last 7 days   Lab Units 01/12/24  0640 01/11/24  1558   SODIUM mmol/L 138 138   POTASSIUM mmol/L 3.3* 3.8   CHLORIDE mmol/L 100 97*   CO2 mmol/L 28 25   BUN mg/dL 20 21   CREATININE mg/dL 1.23* 1.17*   CALCIUM mg/dL 8.9 9.6   PROTEIN TOTAL g/dL 6.2* 7.3   BILIRUBIN TOTAL mg/dL 5.9* 4.3*   ALK PHOS U/L 165* 205*   ALT U/L 126* 145*   AST U/L 135* 208*   GLUCOSE mg/dL 127* 159*         Lab Results   Component Value Date/Time    PROTIME 12.5 01/11/2024 1955    INR 1.1 01/11/2024 1955     No results found for: \"TROPONINT\"  Results from last 7 days   Lab Units 01/11/24  1709 01/11/24  1558   TROPHS ng/L 9 8              No results found for: \"HEPCAB\"  No components found for: \"HIV\"         Past Cardiology Tests (Last 3 Years):  EKG:  No results found for this or any previous visit.    No results found for this or any previous visit.  Echo:  No results found for this or any previous visit.    No results found for this or any previous visit.  Ejection Fractions:  No results found for: \"EF\"  Cath:  No results found for this or any previous visit.    Stress Test:  No results found for this or any previous visit from the past 1000 days.                                             Visit Vitals  /54   Pulse 78   Temp 36 °C (96.8 °F) (Temporal)   Resp 17   Ht 1.6 m (5' 3\")   Wt 101 kg (223 lb)   SpO2 95%   BMI 39.50 kg/m²   OB Status Postmenopausal   Smoking Status Never   BSA 2.12 m²               Relevant Problems   Anesthesia   (+) PONV " (postoperative nausea and vomiting)      Cardiovascular   (+) Hypertension      Endocrine   (+) Controlled type 2 diabetes mellitus with microalbuminuria, without long-term current use of insulin (CMS/HCC)   (+) Hyponatremia   (+) Obesity   (+) Type 2 diabetes mellitus (CMS/HCC)      GI   (+) GERD (gastroesophageal reflux disease)   (+) Gastroesophageal reflux disease      /Renal   (+) Chronic renal insufficiency      Neuro/Psych   (+) CTS (carpal tunnel syndrome)      GI/Hepatic   (+) Gallstones      Musculoskeletal   (+) CTS (carpal tunnel syndrome)      Other   (+) Arthritis of knee       Clinical information reviewed:   Tobacco  Allergies  Meds   Med Hx  Surg Hx  OB Status  Fam Hx  Soc   Hx        NPO Detail:  NPO/Void Status  Carbohydrate Drink Given Prior to Surgery? : N  Date of Last Liquid: 01/12/24  Time of Last Liquid: 1130  Date of Last Solid: 01/11/24  Time of Last Solid: 0830  Last Intake Type: Clear fluids  Time of Last Void: 1154         Physical Exam    Airway  Mallampati: II  TM distance: >3 FB  Neck ROM: full     Cardiovascular   Rhythm: regular  Rate: normal     Dental   Comments: Multiple capped teeth   Pulmonary    Abdominal            Anesthesia Plan    History of general anesthesia?: yes  History of complications of general anesthesia?: yes    ASA 3     general   (General with ETT)  intravenous induction   Postoperative administration of opioids is intended.  Trial extubation is planned.  Anesthetic plan and risks discussed with patient.    Plan discussed with CAA and CRNA.

## 2024-01-12 NOTE — PROGRESS NOTES
01/12/24 1444   Discharge Planning   Living Arrangements Alone   Support Systems Family members   Do you have animals or pets at home? No   Who is requesting discharge planning? Provider   Patient expects to be discharged to: patient unable to be interviewed at this time- off the unit for procedure   Does the patient need discharge transport arranged? Yes   RoundTrip coordination needed? Yes   Has discharge transport been arranged? No     1/12/2024 1446: Patient continues to be off the unit for testing. Unable to discuss discharge planning at this time.

## 2024-01-12 NOTE — HOSPITAL COURSE
Rachele Major is a 73 y.o. female with history of obesity and T2DM who presented to the hospital for abnormal nuclear scan. Patient reports that for the past couple of years she has been experiencing left sided abdominal pain. Prior to admission, pt had a HIDA scan suggestive of possible biliary obstruction. On exam, pt had left and right upper abd tenderness. Labs concerning for biliary occlusion. CT abd pelvis with contrast showed signs of common bild duct obstruction, likely due to neoplasm, with large adjacent lymph node. Likely liver steatosis. Cholelithiasis.   Pt started on Zosyn and IV fluids and GI on board. Arranged transfer for ERCP at [facility] 1/12/24. There, ERCP performed with EUS; entire bile duct dilation found with multiple shadows consistent with stones, which were removed. Pancreatic parenchyma was visualized in the entire pancreas and appeared to have a normal salt and pepper appearance. The pancreatic duct appeared normal. There were large periampullary diverticulum.   Pt transported back to [facility] and recovered. She was stable and had improvement in cholestasis labs. Pt agreed to cholecystectomy on 1/13/24 and it was performed without complications, no intraoperative cholangiogram performed.   Pt discharged home and will follow up with general surgery and GI, in addition to her PCP.

## 2024-01-12 NOTE — ANESTHESIA POSTPROCEDURE EVALUATION
Patient: Rachele Major    Procedure Summary       Date: 01/12/24 Room / Location: [facility]    Anesthesia Start: 1601 Anesthesia Stop: 1721    Procedures:       ERCP      ENDOSCOPIC ULTRASOUND (UPPER) Diagnosis:       Common bile duct (CBD) obstruction      Jaundice      Abnormal CT scan    Scheduled Providers: Juany Espinosa MD; Ivania Cazares MD; Amber Lombardo, RN Responsible Provider: Cristian Alcantara MD    Anesthesia Type: general ASA Status: 3            Anesthesia Type: general    Vitals Value Taken Time   /75 01/12/24 1715   Temp 36.3 °C (97.3 °F) 01/12/24 1713   Pulse 91 01/12/24 1715   Resp 20 01/12/24 1715   SpO2 95 % 01/12/24 1715       Anesthesia Post Evaluation    Patient location during evaluation: bedside  Patient participation: complete - patient cannot participate  Level of consciousness: awake  Pain score: 0  Pain management: adequate  Airway patency: patent  Cardiovascular status: acceptable  Respiratory status: acceptable  Hydration status: acceptable  Postoperative Nausea and Vomiting: none        There were no known notable events for this encounter.

## 2024-01-12 NOTE — CONSULTS
GENERAL SURGERY/TRAUMA  HISTORY AND PHYSICAL/CONSULT    Date: 1/12/2024 Time: 1:37 PM    Name: Rachele Major  MRN: [id number]    This is a 73 y.o. female with one year history of abdominal pain and 3 months of progressively worsening vomiting after eating. A HIDA scan was done yesterday outpatient and she was found to have no filling of the gallbladder or extrahepatic biliary tree consistent with CBD obstruction. She was sent to the [facility] ER for further management. In the ER she was found to be jaundiced with a Tbili of 4.3 and Dbili of 2.6 as well as transaminitis. A CT scan showed intra and extrahepatic biliary duct dilation and a 3 cm mass at the ampulla, as well as small gallstones and a decompressed gallbladder. These findings are highly suspicious for obstructing malignancy.     She was admitted, started on IV abx, and arrangements were made for ERCP today. Her Bilirubin continues to climb today. She was made NPO and denies abdominal pain and n/v since she hasn't eaten.       PAST MEDICAL HISTORY:  Past Medical History:   Diagnosis Date    GERD (gastroesophageal reflux disease)     HTN (hypertension)         PAST SURGICAL HISTORY:  Past Surgical History:   Procedure Laterality Date    WRIST SURGERY     No prior abdominal surgeries.     FAMILY HISTORY:  No family history on file.     SOCIAL HISTORY:  Social History     Tobacco Use    Smoking status: Never    Smokeless tobacco: Never   Vaping Use    Vaping Use: Never used   Substance Use Topics    Alcohol use: Never    Drug use: Never       MEDICATIONS:  Prior to Admission Medications:    Current Facility-Administered Medications:     [Held by provider] aspirin EC tablet 81 mg, 81 mg, oral, Daily, James Mathews DO    [MAR Hold - Suspended Admission] dextrose 10 % in water (D10W) infusion, 0.3 g/kg/hr, intravenous, Once PRN, James Mathews DO    [MAR Hold - Suspended Admission] dextrose 50 % injection 25 g, 25 g, intravenous, q15 min PRN, James Mathews  DO    [MAR Hold - Suspended Admission] glucagon (Glucagen) injection 1 mg, 1 mg, intramuscular, q15 min PRN, James Mathews DO    [Held by provider] hydroCHLOROthiazide (HYDRODiuril) tablet 12.5 mg, 12.5 mg, oral, Daily, James Mathews DO    [MAR Hold - Suspended Admission] HYDROmorphone (Dilaudid) injection 0.2 mg, 0.2 mg, intravenous, q3h PRN, James Mathews DO    [MAR Hold - Suspended Admission] HYDROmorphone (Dilaudid) injection 0.4 mg, 0.4 mg, intravenous, q3h PRN, James Mathews DO    [MAR Hold - Suspended Admission] insulin lispro (HumaLOG) injection 0-5 Units, 0-5 Units, subcutaneous, q4h, James Mathews DO    [MAR Hold - Suspended Admission] lactated Ringer's infusion, 75 mL/hr, intravenous, Continuous, James Mathews DO, Last Rate: 75 mL/hr at 01/12/24 0537, 75 mL/hr at 01/12/24 0537    [Held by provider] losartan (Cozaar) tablet 100 mg, 100 mg, oral, Daily, James Mathews DO    [MAR Hold - Suspended Admission] ondansetron (Zofran) tablet 4 mg, 4 mg, oral, q8h PRN **OR** [MAR Hold - Suspended Admission] ondansetron (Zofran) injection 4 mg, 4 mg, intravenous, q8h PRN, James Mathews DO    [MAR Hold - Suspended Admission] pantoprazole (ProtoNix) EC tablet 40 mg, 40 mg, oral, Daily before breakfast, James Mathews DO, 40 mg at 01/12/24 0631    [MAR Hold - Suspended Admission] piperacillin-tazobactam-dextrose (Zosyn) IV 4.5 g, 4.5 g, intravenous, q6h, James Mathews DO, Stopped at 01/12/24 0554    Current Outpatient Medications:     aspirin 81 mg EC tablet, Take 1 tablet (81 mg) by mouth once daily., Disp: , Rfl:     ergocalciferol (Vitamin D-2) 1.25 MG (75956 UT) capsule, Take 1 capsule (50,000 Units) by mouth once a week., Disp: , Rfl:     losartan (Cozaar) 2.5 mg/mL oral suspension, , Disp: , Rfl:     metFORMIN (Glucophage) 500 mg tablet, Take 1 tablet (500 mg) by mouth 2 times a day with meals., Disp: , Rfl:     omeprazole (PriLOSEC) 20 mg DR capsule, 1 capsule (20 mg)., Disp: , Rfl:      ALLERGIES:  Allergies   Allergen Reactions    Amlodipine Swelling       REVIEW OF SYSTEMS:  GENERAL: Negative for malaise, significant weight loss and fever  NECK: Negative for lumps, goiter, pain and significant neck swelling  RESPIRATORY: Negative for cough, wheezing or shortness of breath.  CARDIOVASCULAR: Negative for chest pain, leg swelling or palpitations.  GI: chronic abdominal pain, N/V  : No history of dysuria, frequency or incontinence  MUSCULOSKELETAL: Negative for joint pain or swelling, back pain or muscle pain.  SKIN: Negative for lesions, rash, and itching.  PSYCH: Negative for sleep disturbance, mood disorder and recent psychosocial stressors.  ENDOCRINE: Negative for cold or heat intolerance, polyuria, polydipsia and goiter.    PHYSICAL EXAM:  Vitals:    01/12/24 1019   BP: 120/71   Pulse: 65   Resp: 18   Temp: 36.2 °C (97.2 °F)   SpO2: 96%     General appearance: obese, appears younger than chronological age  Skin: warm, no erythema or rashes  Lungs: clear to percussion and auscultation  Heart: regular rhythm and S1, S2 normal  Abdomen: soft, distended, nontender  Extremities: Normal exam of the extremities. No swelling or pain.    IMPRESSION:  Rachele Major is a 73 y.o. female with CBD obstruction, likely due to malignancy. She is currently undergoing an ERCP.    PLAN:  I discussed with her that before planning any surgical intervention, we would need the results of today's ERCP. She understands. I explained to her that if it is just an obstruction due to gallstones, she should have her gallbladder removed, but that could be done electively outpatient or this admission. However, if a malignancy is found, that would entail further work up and referral to a [facility] surgeon.    I will sign out this patient to Dr. Lieberman's care for the weekend.     Aneudy Flores MD  Bariatric and Minimally Invasive General Surgery

## 2024-01-12 NOTE — CONSULTS
"Reason For Consult  CBD obstruction     History Of Present Illness  Rachele Major is a 73 y.o. female presenting with concern for biliary obstruction. Pt tells me for a little over the last year she has been getting intermittent epigastric pain. This occurs mostly after eating. Was happening about once per month. Saw her PCP who did RUQ US which showed stones. The patient wasn't convinced it was her GB as it was more epigastric and left sided. The pain had become more frequent and lasting long, associated with chills so she decided to see Dr Rodriguez. He did a HIDA scan which is concerning for biliary obstruction. 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 Has elevated bilirubin. No significant leukocytosis. She denies any family hx. None smoker. No ETOH. No unintentional weight loss.      Past Medical History  She has a past medical history of GERD (gastroesophageal reflux disease) and HTN (hypertension).    Surgical History  She has a past surgical history that includes Wrist surgery.     Social History  She reports that she has never smoked. She has never used smokeless tobacco. She reports that she does not drink alcohol and does not use drugs.    Family History  No family history on file.     Allergies  Amlodipine    Review of Systems  A12 point ROS negative, pertinent positives noted in HPI        Physical Exam  /71 (BP Location: Left arm, Patient Position: Lying)   Pulse 65   Temp 36.2 °C (97.2 °F)   Resp 18   Ht 1.727 m (5' 8\")   Wt 101 kg (223 lb)   SpO2 96%   BMI 33.91 kg/m²   General: Alert and awake, NAD  HENT: normocephalic, PERRL, icteric  Skin: mild jaundice, intact   RESP: Nonlabored on RA  CARD: RRR  GI: soft, mild epigastric pain, ND, no ascites  Extremities: no edema  Neuro: A&Ox3, no asterixis  Psych: Calm and cooperative      Last Recorded Vitals  Blood pressure 120/71, pulse 65, temperature 36.2 °C (97.2 °F), resp. rate 18, height 1.727 m (5' 8\"), weight " 101 kg (223 lb), SpO2 96 %.    Relevant Results  Scheduled medications  [Held by provider] aspirin, 81 mg, oral, Daily  [Held by provider] hydroCHLOROthiazide, 12.5 mg, oral, Daily  [MAR Hold - Suspended Admission] insulin lispro, 0-5 Units, subcutaneous, q4h  [Held by provider] losartan, 100 mg, oral, Daily  [MAR Hold - Suspended Admission] pantoprazole, 40 mg, oral, Daily before breakfast  [MAR Hold - Suspended Admission] piperacillin-tazobactam, 4.5 g, intravenous, q6h      Continuous medications  [MAR Hold - Suspended Admission] lactated Ringer's, 75 mL/hr, Last Rate: 75 mL/hr (01/12/24 0537)      PRN medications  PRN medications: [MAR Hold - Suspended Admission] dextrose 10 % in water (D10W), [MAR Hold - Suspended Admission] dextrose, [MAR Hold - Suspended Admission] glucagon, [MAR Hold - Suspended Admission] HYDROmorphone, [MAR Hold - Suspended Admission] HYDROmorphone, [MAR Hold - Suspended Admission] ondansetron **OR** [MAR Hold - Suspended Admission] ondansetron    Results for orders placed or performed during the hospital encounter of 01/11/24 (from the past 96 hour(s))   CBC and Auto Differential   Result Value Ref Range    WBC 11.1 4.4 - 11.3 x10*3/uL    nRBC 0.0 0.0 - 0.0 /100 WBCs    RBC 5.28 (H) 4.00 - 5.20 x10*6/uL    Hemoglobin 14.7 12.0 - 16.0 g/dL    Hematocrit 46.3 (H) 36.0 - 46.0 %    MCV 88 80 - 100 fL    MCH 27.8 26.0 - 34.0 pg    MCHC 31.7 (L) 32.0 - 36.0 g/dL    RDW 12.6 11.5 - 14.5 %    Platelets 305 150 - 450 x10*3/uL    Neutrophils % 77.7 40.0 - 80.0 %    Immature Granulocytes %, Automated 0.3 0.0 - 0.9 %    Lymphocytes % 11.5 13.0 - 44.0 %    Monocytes % 9.6 2.0 - 10.0 %    Eosinophils % 0.4 0.0 - 6.0 %    Basophils % 0.5 0.0 - 2.0 %    Neutrophils Absolute 8.62 (H) 1.60 - 5.50 x10*3/uL    Immature Granulocytes Absolute, Automated 0.03 0.00 - 0.50 x10*3/uL    Lymphocytes Absolute 1.28 0.80 - 3.00 x10*3/uL    Monocytes Absolute 1.07 (H) 0.05 - 0.80 x10*3/uL    Eosinophils Absolute 0.04  0.00 - 0.40 x10*3/uL    Basophils Absolute 0.05 0.00 - 0.10 x10*3/uL   Basic metabolic panel   Result Value Ref Range    Glucose 159 (H) 74 - 99 mg/dL    Sodium 138 136 - 145 mmol/L    Potassium 3.8 3.5 - 5.3 mmol/L    Chloride 97 (L) 98 - 107 mmol/L    Bicarbonate 25 21 - 32 mmol/L    Anion Gap 20 10 - 20 mmol/L    Urea Nitrogen 21 6 - 23 mg/dL    Creatinine 1.17 (H) 0.50 - 1.05 mg/dL    eGFR 50 (L) >60 mL/min/1.73m*2    Calcium 9.6 8.6 - 10.3 mg/dL   Lipase   Result Value Ref Range    Lipase 19 9 - 82 U/L   Hepatic function panel   Result Value Ref Range    Albumin 4.2 3.4 - 5.0 g/dL    Bilirubin, Total 4.3 (H) 0.0 - 1.2 mg/dL    Bilirubin, Direct 2.6 (H) 0.0 - 0.3 mg/dL    Alkaline Phosphatase 205 (H) 33 - 136 U/L     (H) 7 - 45 U/L     (H) 9 - 39 U/L    Total Protein 7.3 6.4 - 8.2 g/dL   Magnesium   Result Value Ref Range    Magnesium 1.43 (L) 1.60 - 2.40 mg/dL   Troponin I, High Sensitivity, Initial   Result Value Ref Range    Troponin I, High Sensitivity 8 0 - 13 ng/L   Troponin, High Sensitivity, 1 Hour   Result Value Ref Range    Troponin I, High Sensitivity 9 0 - 13 ng/L   C-reactive protein   Result Value Ref Range    C-Reactive Protein 5.55 (H) <1.00 mg/dL   POCT GLUCOSE   Result Value Ref Range    POCT Glucose 121 (H) 74 - 99 mg/dL   Blood Culture    Specimen: Peripheral Venipuncture; Blood culture   Result Value Ref Range    Blood Culture Loaded on Instrument - Culture in progress    Blood Culture    Specimen: Peripheral Venipuncture; Blood culture   Result Value Ref Range    Blood Culture Loaded on Instrument - Culture in progress    Protime-INR   Result Value Ref Range    Protime 12.5 9.8 - 12.8 seconds    INR 1.1 0.9 - 1.1   POCT GLUCOSE   Result Value Ref Range    POCT Glucose 135 (H) 74 - 99 mg/dL   POCT GLUCOSE   Result Value Ref Range    POCT Glucose 123 (H) 74 - 99 mg/dL   CBC   Result Value Ref Range    WBC 6.8 4.4 - 11.3 x10*3/uL    nRBC 0.0 0.0 - 0.0 /100 WBCs    RBC 4.52 4.00 -  5.20 x10*6/uL    Hemoglobin 12.6 12.0 - 16.0 g/dL    Hematocrit 39.3 36.0 - 46.0 %    MCV 87 80 - 100 fL    MCH 27.9 26.0 - 34.0 pg    MCHC 32.1 32.0 - 36.0 g/dL    RDW 12.7 11.5 - 14.5 %    Platelets 243 150 - 450 x10*3/uL   Comprehensive Metabolic Panel   Result Value Ref Range    Glucose 127 (H) 74 - 99 mg/dL    Sodium 138 136 - 145 mmol/L    Potassium 3.3 (L) 3.5 - 5.3 mmol/L    Chloride 100 98 - 107 mmol/L    Bicarbonate 28 21 - 32 mmol/L    Anion Gap 13 10 - 20 mmol/L    Urea Nitrogen 20 6 - 23 mg/dL    Creatinine 1.23 (H) 0.50 - 1.05 mg/dL    eGFR 47 (L) >60 mL/min/1.73m*2    Calcium 8.9 8.6 - 10.3 mg/dL    Albumin 3.4 3.4 - 5.0 g/dL    Alkaline Phosphatase 165 (H) 33 - 136 U/L    Total Protein 6.2 (L) 6.4 - 8.2 g/dL     (H) 9 - 39 U/L    Bilirubin, Total 5.9 (H) 0.0 - 1.2 mg/dL     (H) 7 - 45 U/L   Magnesium   Result Value Ref Range    Magnesium 1.64 1.60 - 2.40 mg/dL   Phosphorus   Result Value Ref Range    Phosphorus 3.2 2.5 - 4.9 mg/dL   Bilirubin, Direct   Result Value Ref Range    Bilirubin, Direct 3.7 (H) 0.0 - 0.3 mg/dL   POCT GLUCOSE   Result Value Ref Range    POCT Glucose 128 (H) 74 - 99 mg/dL      CT abdomen pelvis w IV contrast    Result Date: 1/11/2024  Interpreted By:  Kenyon Ortiz, STUDY: CT ABDOMEN PELVIS W IV CONTRAST;  1/11/2024 4:54 pm   INDICATION: Signs/Symptoms:Epigastric and left upper quadrant abdominal pain however patient had a nuclear medicine scan done just prior to arrival concerning for gallbladder involvement.   COMPARISON: January 11, 2024 nuclear medicine hepatobiliary scan. December 5, 2022 CT calcium score examination.   ACCESSION NUMBER(S): [id number]   ORDERING CLINICIAN: MARTHA TOUSSAINT   TECHNIQUE: CT of the abdomen and pelvis was performed.  Standard contiguous axial images were obtained at 3 mm slice thickness through the abdomen and pelvis. Coronal and sagittal reconstructions at 3 mm slice thickness were performed.   75 mL of Omnipaque 350 were  administered intravenously without immediate complication.   FINDINGS: LOWER CHEST: The lung bases are clear. There is a small hiatal hernia.   ABDOMEN:   LIVER: The liver measures 20 cm length. The liver is hypoattenuating likely indicating steatosis. Normal morphology without detected mass. There are probably geographic areas of fat sparing adjacent to the gallbladder.   BILE DUCTS: There is dilation of the intra and extrahepatic bile ducts with abrupt tapering distal common bile duct. The mid extrahepatic bile duct measures 13 mm caliber coronal series 202, image 46.   There is polypoidal soft tissue density masslike abnormality extending from the ampullary region to the dilated portion of the distal common bile duct 3.0 x 1.7 x 2.1 cm coronal series 202, image 46, axial series 201, image 59. 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 The pattern would be atypical for obstructing debris. Further evaluation is recommended.   There is gas-filled structure along the posterior aspect of the abnormality medial to the descending segment of the duodenum series 201, image 56 most compatible with a duodenal diverticulum with potential tiny neck at the descending duodenum proximal to the ampulla although limited visualization of the neck.   GALLBLADDER: Decompressed. There are probably small gallstones.   PANCREAS: The pancreas is diffusely atrophic. No well-delineated significant pancreatic duct dilation.   SPLEEN: Within normal limits.   ADRENAL GLANDS: Within normal limits.   KIDNEYS AND URETERS: The kidneys are normal in size and enhance symmetrically.  No hydroureteronephrosis or nephroureterolithiasis is identified.   PELVIS:   BLADDER: Decompressed limiting the assessment without evident mass.   REPRODUCTIVE ORGANS: No pelvic mass.   BOWEL: The stomach and bowel are normal caliber without inflammatory  change.   VESSELS: The principal central vessels are patent and normal caliber with trace atherosclerosis.   PERITONEUM/RETROPERITONEUM/LYMPH NODES: No ascites or free air, no fluid collection.  There is a prominent node 5.1 cm craniocaudal diameter along the posterior aspect of the superior pancreatic head anterior to the IVC and adjacent to the hepatic artery coronal series 202, image 50 with smaller adjacent nodes.   BONES AND ABDOMINAL WALL: No suspicious osseous lesions are identified. Degenerative discogenic disease is noted in the lower thoracic and lumbar spine. Unremarkable body wall.       1.  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 Further evaluation recommended including correlation with tumor markers, and tissue sampling. Recommend GI and surgical consultation. MRCP correlation could be considered. 2. Enlarged liver with probable steatosis. 3. Cholelithiasis.     Signed by: Kenyon Ortiz 1/11/2024 6:06 PM Dictation workstation:   [id number]    NM hepatobiliary    Result Date: 1/11/2024  Interpreted By:  Jose Snyder and Maltbie Grace STUDY: NM HEPATOBILIARY;  1/11/2024 3:36 pm   INDICATION: Signs/Symptoms:Epigastric pain nausea.   COMPARISON: None.   ACCESSION NUMBER(S): [id number]   ORDERING CLINICIAN: AMRIK BRANDON   TECHNIQUE: DIVISION OF NUCLEAR MEDICINE HEPATOBILIARY SCAN (HIDA)   The patient received an intravenous dose of  5.0 mCi of Tc-99m mebrofenin (Choletec).  Sequential images of the upper abdomen were then acquired over the next 120 minutes.   FINDINGS: Persistent radiotracer retention in the liver parenchyma without excretion into the biliary tree or small bowel. The gallbladder was not visualized by 2 hours after radiotracer injection.       1. Persistent radiotracer retention in the liver parenchyma without excretion into biliary tree or small bowel. Findings are concerning for  high-grade common bile duct obstruction, alternatively findings can be seen with severe liver dysfunction. Further evaluation with MRCP may be of added value.     Findings were communicated with Dr. Rodriguez By Dr. Moriah Jeffrey via phone at 3:30 pm on 1/11/2024 with read back verification. Patient was sent to the emergency department for further evaluation.   I personally reviewed the images/study and I agree with the findings as stated by Nuclear Medicine fellow Moriah Jeffrey MD. This study was interpreted at [place], [place].   MACRO: Critical Finding:  See findings. Notification was initiated on 1/11/2024 at 3:42 pm by  Moriah Jeffrey.  (**-OCF-**) Instructions:   Signed by: Jose Snyder 1/11/2024 3:48 PM Dictation workstation:   [id number]       Assessment/Plan     73 year old female with intermittent abdominal pain for last year, increasing in frequency. Imaging showing CBD obstruction, stone vs ampullary cancer. Bilirubin elevated. No signs of cholangitis. Plan for ERCP today at [facility].    -NPO  -ERCP today at [facility] with plan to return   -Ca 19-9  -Trend CBC, CMP daily  -Surgery consulted, appreciate input  -Continue ATB  -Monitor for post ERCP complications     GI will continue to follow, please contact with any questions.     Discussed with Dr Pulido, who is in agreement.    Rabia Thakkar, CNP

## 2024-01-13 ENCOUNTER — ANESTHESIA (OUTPATIENT)
Dept: OPERATING ROOM | Facility: HOSPITAL | Age: 74
DRG: 418 | End: 2024-01-13
Payer: MEDICARE

## 2024-01-13 ENCOUNTER — PREP FOR PROCEDURE (OUTPATIENT)
Dept: ANESTHESIOLOGY | Facility: HOSPITAL | Age: 74
End: 2024-01-13

## 2024-01-13 ENCOUNTER — ANESTHESIA EVENT (OUTPATIENT)
Dept: OPERATING ROOM | Facility: HOSPITAL | Age: 74
DRG: 418 | End: 2024-01-13
Payer: MEDICARE

## 2024-01-13 PROBLEM — G89.18 POST-OP PAIN: Status: ACTIVE | Noted: 2024-01-13

## 2024-01-13 PROBLEM — Z90.49 S/P CHOLECYSTECTOMY: Status: RESOLVED | Noted: 2024-01-13 | Resolved: 2024-01-13

## 2024-01-13 PROBLEM — Z90.49 S/P CHOLECYSTECTOMY: Status: ACTIVE | Noted: 2024-01-13

## 2024-01-13 PROBLEM — K83.1 COMMON BILE DUCT (CBD) OBSTRUCTION (CMS-HCC): Status: RESOLVED | Noted: 2024-01-11 | Resolved: 2024-01-13

## 2024-01-13 LAB
ALBUMIN SERPL BCP-MCNC: 3.3 G/DL (ref 3.4–5)
ALP SERPL-CCNC: 145 U/L (ref 33–136)
ALT SERPL W P-5'-P-CCNC: 98 U/L (ref 7–45)
ANION GAP SERPL CALC-SCNC: 14 MMOL/L (ref 10–20)
AST SERPL W P-5'-P-CCNC: 80 U/L (ref 9–39)
BILIRUB DIRECT SERPL-MCNC: 2.6 MG/DL (ref 0–0.3)
BILIRUB SERPL-MCNC: 4 MG/DL (ref 0–1.2)
BUN SERPL-MCNC: 19 MG/DL (ref 6–23)
CALCIUM SERPL-MCNC: 8.9 MG/DL (ref 8.6–10.3)
CHLORIDE SERPL-SCNC: 102 MMOL/L (ref 98–107)
CO2 SERPL-SCNC: 28 MMOL/L (ref 21–32)
CREAT SERPL-MCNC: 1.14 MG/DL (ref 0.5–1.05)
EGFRCR SERPLBLD CKD-EPI 2021: 51 ML/MIN/1.73M*2
ERYTHROCYTE [DISTWIDTH] IN BLOOD BY AUTOMATED COUNT: 12.6 % (ref 11.5–14.5)
GLUCOSE BLD MANUAL STRIP-MCNC: 110 MG/DL (ref 74–99)
GLUCOSE BLD MANUAL STRIP-MCNC: 151 MG/DL (ref 74–99)
GLUCOSE BLD MANUAL STRIP-MCNC: 185 MG/DL (ref 74–99)
GLUCOSE SERPL-MCNC: 121 MG/DL (ref 74–99)
HCT VFR BLD AUTO: 39.9 % (ref 36–46)
HGB BLD-MCNC: 12.7 G/DL (ref 12–16)
LACTATE SERPL-SCNC: 1.2 MMOL/L (ref 0.4–2)
LIPASE SERPL-CCNC: 45 U/L (ref 9–82)
MAGNESIUM SERPL-MCNC: 1.81 MG/DL (ref 1.6–2.4)
MCH RBC QN AUTO: 28 PG (ref 26–34)
MCHC RBC AUTO-ENTMCNC: 31.8 G/DL (ref 32–36)
MCV RBC AUTO: 88 FL (ref 80–100)
NRBC BLD-RTO: 0 /100 WBCS (ref 0–0)
PHOSPHATE SERPL-MCNC: 3.2 MG/DL (ref 2.5–4.9)
PLATELET # BLD AUTO: 215 X10*3/UL (ref 150–450)
POTASSIUM SERPL-SCNC: 3.5 MMOL/L (ref 3.5–5.3)
PROT SERPL-MCNC: 6.1 G/DL (ref 6.4–8.2)
RBC # BLD AUTO: 4.53 X10*6/UL (ref 4–5.2)
SODIUM SERPL-SCNC: 140 MMOL/L (ref 136–145)
WBC # BLD AUTO: 5.6 X10*3/UL (ref 4.4–11.3)

## 2024-01-13 PROCEDURE — 99140 ANES COMP EMERGENCY COND: CPT | Performed by: ANESTHESIOLOGY

## 2024-01-13 PROCEDURE — 2500000004 HC RX 250 GENERAL PHARMACY W/ HCPCS (ALT 636 FOR OP/ED): Mod: MUE

## 2024-01-13 PROCEDURE — 94760 N-INVAS EAR/PLS OXIMETRY 1: CPT | Mod: MUE

## 2024-01-13 PROCEDURE — 3600000003 HC OR TIME - INITIAL BASE CHARGE - PROCEDURE LEVEL THREE: Performed by: SURGERY

## 2024-01-13 PROCEDURE — 88304 TISSUE EXAM BY PATHOLOGIST: CPT | Mod: TC,SUR,GEALAB | Performed by: SURGERY

## 2024-01-13 PROCEDURE — 82248 BILIRUBIN DIRECT: CPT

## 2024-01-13 PROCEDURE — 2500000004 HC RX 250 GENERAL PHARMACY W/ HCPCS (ALT 636 FOR OP/ED): Performed by: SURGERY

## 2024-01-13 PROCEDURE — 99100 ANES PT EXTEME AGE<1 YR&>70: CPT | Performed by: ANESTHESIOLOGY

## 2024-01-13 PROCEDURE — 2500000004 HC RX 250 GENERAL PHARMACY W/ HCPCS (ALT 636 FOR OP/ED): Performed by: NURSE ANESTHETIST, CERTIFIED REGISTERED

## 2024-01-13 PROCEDURE — 88304 TISSUE EXAM BY PATHOLOGIST: CPT

## 2024-01-13 PROCEDURE — 36415 COLL VENOUS BLD VENIPUNCTURE: CPT

## 2024-01-13 PROCEDURE — 82947 ASSAY GLUCOSE BLOOD QUANT: CPT

## 2024-01-13 PROCEDURE — 84100 ASSAY OF PHOSPHORUS: CPT

## 2024-01-13 PROCEDURE — 83605 ASSAY OF LACTIC ACID: CPT | Performed by: INTERNAL MEDICINE

## 2024-01-13 PROCEDURE — 83690 ASSAY OF LIPASE: CPT

## 2024-01-13 PROCEDURE — 47562 LAPAROSCOPIC CHOLECYSTECTOMY: CPT | Performed by: SURGERY

## 2024-01-13 PROCEDURE — A47563 PR LAP,CHOLECYSTECTOMY/GRAPH: Performed by: NURSE ANESTHETIST, CERTIFIED REGISTERED

## 2024-01-13 PROCEDURE — 3600000008 HC OR TIME - EACH INCREMENTAL 1 MINUTE - PROCEDURE LEVEL THREE: Performed by: SURGERY

## 2024-01-13 PROCEDURE — 99232 SBSQ HOSP IP/OBS MODERATE 35: CPT

## 2024-01-13 PROCEDURE — 83735 ASSAY OF MAGNESIUM: CPT

## 2024-01-13 PROCEDURE — 1100000001 HC PRIVATE ROOM DAILY

## 2024-01-13 PROCEDURE — 7100000002 HC RECOVERY ROOM TIME - EACH INCREMENTAL 1 MINUTE: Performed by: SURGERY

## 2024-01-13 PROCEDURE — 2500000004 HC RX 250 GENERAL PHARMACY W/ HCPCS (ALT 636 FOR OP/ED)

## 2024-01-13 PROCEDURE — 99232 SBSQ HOSP IP/OBS MODERATE 35: CPT | Performed by: INTERNAL MEDICINE

## 2024-01-13 PROCEDURE — 85027 COMPLETE CBC AUTOMATED: CPT

## 2024-01-13 PROCEDURE — 0FT44ZZ RESECTION OF GALLBLADDER, PERCUTANEOUS ENDOSCOPIC APPROACH: ICD-10-PCS | Performed by: SURGERY

## 2024-01-13 PROCEDURE — 2500000001 HC RX 250 WO HCPCS SELF ADMINISTERED DRUGS (ALT 637 FOR MEDICARE OP): Performed by: SURGERY

## 2024-01-13 PROCEDURE — 2780000003 HC OR 278 NO HCPCS: Performed by: SURGERY

## 2024-01-13 PROCEDURE — 3700000002 HC GENERAL ANESTHESIA TIME - EACH INCREMENTAL 1 MINUTE: Performed by: SURGERY

## 2024-01-13 PROCEDURE — 2500000001 HC RX 250 WO HCPCS SELF ADMINISTERED DRUGS (ALT 637 FOR MEDICARE OP)

## 2024-01-13 PROCEDURE — 2500000005 HC RX 250 GENERAL PHARMACY W/O HCPCS: Performed by: NURSE ANESTHETIST, CERTIFIED REGISTERED

## 2024-01-13 PROCEDURE — 7100000001 HC RECOVERY ROOM TIME - INITIAL BASE CHARGE: Performed by: SURGERY

## 2024-01-13 PROCEDURE — 80053 COMPREHEN METABOLIC PANEL: CPT

## 2024-01-13 PROCEDURE — 3700000001 HC GENERAL ANESTHESIA TIME - INITIAL BASE CHARGE: Performed by: SURGERY

## 2024-01-13 PROCEDURE — 2500000002 HC RX 250 W HCPCS SELF ADMINISTERED DRUGS (ALT 637 FOR MEDICARE OP, ALT 636 FOR OP/ED)

## 2024-01-13 PROCEDURE — 2720000007 HC OR 272 NO HCPCS: Performed by: SURGERY

## 2024-01-13 PROCEDURE — 36415 COLL VENOUS BLD VENIPUNCTURE: CPT | Performed by: INTERNAL MEDICINE

## 2024-01-13 PROCEDURE — 2500000005 HC RX 250 GENERAL PHARMACY W/O HCPCS: Performed by: SURGERY

## 2024-01-13 PROCEDURE — A47563 PR LAP,CHOLECYSTECTOMY/GRAPH: Performed by: ANESTHESIOLOGY

## 2024-01-13 RX ORDER — SODIUM CHLORIDE, SODIUM LACTATE, POTASSIUM CHLORIDE, CALCIUM CHLORIDE 600; 310; 30; 20 MG/100ML; MG/100ML; MG/100ML; MG/100ML
125 INJECTION, SOLUTION INTRAVENOUS CONTINUOUS
Status: CANCELLED | OUTPATIENT
Start: 2024-01-13

## 2024-01-13 RX ORDER — LIDOCAINE HYDROCHLORIDE 20 MG/ML
INJECTION, SOLUTION INFILTRATION; PERINEURAL AS NEEDED
Status: DISCONTINUED | OUTPATIENT
Start: 2024-01-13 | End: 2024-01-13

## 2024-01-13 RX ORDER — ENOXAPARIN SODIUM 100 MG/ML
30 INJECTION SUBCUTANEOUS ONCE
Status: CANCELLED | OUTPATIENT
Start: 2024-01-13 | End: 2024-01-13

## 2024-01-13 RX ORDER — PHENYLEPHRINE HCL IN 0.9% NACL 0.4MG/10ML
SYRINGE (ML) INTRAVENOUS AS NEEDED
Status: DISCONTINUED | OUTPATIENT
Start: 2024-01-13 | End: 2024-01-13

## 2024-01-13 RX ORDER — ONDANSETRON 4 MG/1
4 TABLET, FILM COATED ORAL EVERY 8 HOURS PRN
Qty: 10 TABLET | Refills: 0 | Status: SHIPPED | OUTPATIENT
Start: 2024-01-13

## 2024-01-13 RX ORDER — ENOXAPARIN SODIUM 100 MG/ML
INJECTION SUBCUTANEOUS AS NEEDED
Status: DISCONTINUED | OUTPATIENT
Start: 2024-01-13 | End: 2024-01-13

## 2024-01-13 RX ORDER — ONDANSETRON HYDROCHLORIDE 2 MG/ML
4 INJECTION, SOLUTION INTRAVENOUS
Status: CANCELLED | OUTPATIENT
Start: 2024-01-13

## 2024-01-13 RX ORDER — FENTANYL CITRATE 50 UG/ML
INJECTION, SOLUTION INTRAMUSCULAR; INTRAVENOUS AS NEEDED
Status: DISCONTINUED | OUTPATIENT
Start: 2024-01-13 | End: 2024-01-13

## 2024-01-13 RX ORDER — MIDAZOLAM HYDROCHLORIDE 1 MG/ML
INJECTION INTRAMUSCULAR; INTRAVENOUS AS NEEDED
Status: DISCONTINUED | OUTPATIENT
Start: 2024-01-13 | End: 2024-01-13

## 2024-01-13 RX ORDER — LACTULOSE 10 G/15ML
20 SOLUTION ORAL DAILY
Status: DISCONTINUED | OUTPATIENT
Start: 2024-01-13 | End: 2024-01-14 | Stop reason: HOSPADM

## 2024-01-13 RX ORDER — DIPHENHYDRAMINE HYDROCHLORIDE 50 MG/ML
25 INJECTION INTRAMUSCULAR; INTRAVENOUS
Status: CANCELLED | OUTPATIENT
Start: 2024-01-13

## 2024-01-13 RX ORDER — HEPARIN SODIUM 5000 [USP'U]/ML
5000 INJECTION, SOLUTION INTRAVENOUS; SUBCUTANEOUS EVERY 8 HOURS
Status: DISCONTINUED | OUTPATIENT
Start: 2024-01-14 | End: 2024-01-14 | Stop reason: HOSPADM

## 2024-01-13 RX ORDER — AMOXICILLIN AND CLAVULANATE POTASSIUM 875; 125 MG/1; MG/1
1 TABLET, FILM COATED ORAL 2 TIMES DAILY
Qty: 4 TABLET | Refills: 0 | Status: SHIPPED | OUTPATIENT
Start: 2024-01-13 | End: 2024-01-14 | Stop reason: HOSPADM

## 2024-01-13 RX ORDER — METOCLOPRAMIDE HYDROCHLORIDE 5 MG/ML
INJECTION INTRAMUSCULAR; INTRAVENOUS AS NEEDED
Status: DISCONTINUED | OUTPATIENT
Start: 2024-01-13 | End: 2024-01-13

## 2024-01-13 RX ORDER — DOCUSATE SODIUM 100 MG/1
100 CAPSULE, LIQUID FILLED ORAL 2 TIMES DAILY
Status: DISCONTINUED | OUTPATIENT
Start: 2024-01-13 | End: 2024-01-14 | Stop reason: HOSPADM

## 2024-01-13 RX ORDER — FAMOTIDINE 10 MG/ML
INJECTION INTRAVENOUS AS NEEDED
Status: DISCONTINUED | OUTPATIENT
Start: 2024-01-13 | End: 2024-01-13

## 2024-01-13 RX ORDER — PROPOFOL 10 MG/ML
INJECTION, EMULSION INTRAVENOUS AS NEEDED
Status: DISCONTINUED | OUTPATIENT
Start: 2024-01-13 | End: 2024-01-13

## 2024-01-13 RX ORDER — HYDROCODONE BITARTRATE AND ACETAMINOPHEN 5; 325 MG/1; MG/1
1 TABLET ORAL EVERY 4 HOURS PRN
Status: DISCONTINUED | OUTPATIENT
Start: 2024-01-13 | End: 2024-01-14 | Stop reason: HOSPADM

## 2024-01-13 RX ORDER — CYCLOBENZAPRINE HCL 5 MG
5 TABLET ORAL 3 TIMES DAILY
Status: DISCONTINUED | OUTPATIENT
Start: 2024-01-13 | End: 2024-01-14 | Stop reason: HOSPADM

## 2024-01-13 RX ORDER — LOSARTAN POTASSIUM AND HYDROCHLOROTHIAZIDE 25; 100 MG/1; MG/1
1 TABLET ORAL DAILY
COMMUNITY
End: 2024-01-14 | Stop reason: HOSPADM

## 2024-01-13 RX ORDER — INDOCYANINE GREEN AND WATER 25 MG
2.5 KIT INJECTION ONCE
Status: CANCELLED | OUTPATIENT
Start: 2024-01-13 | End: 2024-01-13

## 2024-01-13 RX ORDER — INDOCYANINE GREEN AND WATER 25 MG
KIT INJECTION AS NEEDED
Status: DISCONTINUED | OUTPATIENT
Start: 2024-01-13 | End: 2024-01-13

## 2024-01-13 RX ORDER — DEXAMETHASONE SODIUM PHOSPHATE 4 MG/ML
INJECTION, SOLUTION INTRA-ARTICULAR; INTRALESIONAL; INTRAMUSCULAR; INTRAVENOUS; SOFT TISSUE AS NEEDED
Status: DISCONTINUED | OUTPATIENT
Start: 2024-01-13 | End: 2024-01-13

## 2024-01-13 RX ORDER — HYDROCODONE BITARTRATE AND ACETAMINOPHEN 5; 325 MG/1; MG/1
2 TABLET ORAL EVERY 4 HOURS PRN
Status: DISCONTINUED | OUTPATIENT
Start: 2024-01-13 | End: 2024-01-14 | Stop reason: HOSPADM

## 2024-01-13 RX ORDER — SCOLOPAMINE TRANSDERMAL SYSTEM 1 MG/1
PATCH, EXTENDED RELEASE TRANSDERMAL AS NEEDED
Status: DISCONTINUED | OUTPATIENT
Start: 2024-01-13 | End: 2024-01-13

## 2024-01-13 RX ORDER — AMOXICILLIN AND CLAVULANATE POTASSIUM 875; 125 MG/1; MG/1
875 TABLET, FILM COATED ORAL EVERY 12 HOURS SCHEDULED
Status: DISCONTINUED | OUTPATIENT
Start: 2024-01-13 | End: 2024-01-14 | Stop reason: HOSPADM

## 2024-01-13 RX ORDER — ROCURONIUM BROMIDE 10 MG/ML
INJECTION, SOLUTION INTRAVENOUS AS NEEDED
Status: DISCONTINUED | OUTPATIENT
Start: 2024-01-13 | End: 2024-01-13

## 2024-01-13 RX ADMIN — ONDANSETRON 4 MG: 2 INJECTION, SOLUTION INTRAMUSCULAR; INTRAVENOUS at 10:37

## 2024-01-13 RX ADMIN — FENTANYL CITRATE 75 MCG: 50 INJECTION, SOLUTION INTRAMUSCULAR; INTRAVENOUS at 10:29

## 2024-01-13 RX ADMIN — METOCLOPRAMIDE 10 MG: 5 INJECTION, SOLUTION INTRAMUSCULAR; INTRAVENOUS at 11:06

## 2024-01-13 RX ADMIN — Medication 100 MCG: at 10:55

## 2024-01-13 RX ADMIN — SCOLOPAMINE TRANSDERMAL SYSTEM 1 PATCH: 1 PATCH, EXTENDED RELEASE TRANSDERMAL at 10:37

## 2024-01-13 RX ADMIN — MIDAZOLAM HYDROCHLORIDE 2 MG: 1 INJECTION, SOLUTION INTRAMUSCULAR; INTRAVENOUS at 10:24

## 2024-01-13 RX ADMIN — SODIUM CHLORIDE, POTASSIUM CHLORIDE, SODIUM LACTATE AND CALCIUM CHLORIDE 50 ML/HR: 600; 310; 30; 20 INJECTION, SOLUTION INTRAVENOUS at 22:59

## 2024-01-13 RX ADMIN — PANTOPRAZOLE SODIUM 40 MG: 40 TABLET, DELAYED RELEASE ORAL at 06:19

## 2024-01-13 RX ADMIN — ENOXAPARIN SODIUM 30 MG: 40 INJECTION SUBCUTANEOUS at 10:47

## 2024-01-13 RX ADMIN — DEXAMETHASONE SODIUM PHOSPHATE 8 MG: 4 INJECTION INTRA-ARTICULAR; INTRALESIONAL; INTRAMUSCULAR; INTRAVENOUS; SOFT TISSUE at 10:37

## 2024-01-13 RX ADMIN — PIPERACILLIN SODIUM AND TAZOBACTAM SODIUM 4.5 G: 4; .5 INJECTION, SOLUTION INTRAVENOUS at 05:51

## 2024-01-13 RX ADMIN — FENTANYL CITRATE 25 MCG: 50 INJECTION, SOLUTION INTRAMUSCULAR; INTRAVENOUS at 12:03

## 2024-01-13 RX ADMIN — FAMOTIDINE 20 MG: 10 INJECTION, SOLUTION INTRAVENOUS at 11:25

## 2024-01-13 RX ADMIN — Medication 100 MCG: at 10:42

## 2024-01-13 RX ADMIN — INSULIN LISPRO 1 UNITS: 100 INJECTION, SOLUTION INTRAVENOUS; SUBCUTANEOUS at 22:57

## 2024-01-13 RX ADMIN — CYCLOBENZAPRINE HYDROCHLORIDE 5 MG: 5 TABLET, FILM COATED ORAL at 20:18

## 2024-01-13 RX ADMIN — PROPOFOL 200 MG: 10 INJECTION, EMULSION INTRAVENOUS at 10:33

## 2024-01-13 RX ADMIN — TAZOBACTAM SODIUM AND PIPERACILLIN SODIUM 3.38 G: 375; 3 INJECTION, SOLUTION INTRAVENOUS at 11:30

## 2024-01-13 RX ADMIN — DOCUSATE SODIUM 100 MG: 100 CAPSULE, LIQUID FILLED ORAL at 13:29

## 2024-01-13 RX ADMIN — ROCURONIUM BROMIDE 50 MG: 10 INJECTION, SOLUTION INTRAVENOUS at 10:33

## 2024-01-13 RX ADMIN — CYCLOBENZAPRINE HYDROCHLORIDE 5 MG: 5 TABLET, FILM COATED ORAL at 15:25

## 2024-01-13 RX ADMIN — SODIUM CHLORIDE, SODIUM LACTATE, POTASSIUM CHLORIDE, AND CALCIUM CHLORIDE: 600; 310; 30; 20 INJECTION, SOLUTION INTRAVENOUS at 10:00

## 2024-01-13 RX ADMIN — SODIUM CHLORIDE, SODIUM LACTATE, POTASSIUM CHLORIDE, AND CALCIUM CHLORIDE: 600; 310; 30; 20 INJECTION, SOLUTION INTRAVENOUS at 10:34

## 2024-01-13 RX ADMIN — DOCUSATE SODIUM 100 MG: 100 CAPSULE, LIQUID FILLED ORAL at 20:18

## 2024-01-13 RX ADMIN — INDOCYANINE GREEN AND WATER 2.5 MG: KIT at 10:56

## 2024-01-13 RX ADMIN — AMOXICILLIN AND CLAVULANATE POTASSIUM 875 MG: 875; 125 TABLET, FILM COATED ORAL at 20:18

## 2024-01-13 RX ADMIN — FAMOTIDINE 20 MG: 10 INJECTION, SOLUTION INTRAVENOUS at 11:44

## 2024-01-13 RX ADMIN — LIDOCAINE HYDROCHLORIDE 100 MG: 20 INJECTION, SOLUTION INFILTRATION; PERINEURAL at 10:32

## 2024-01-13 RX ADMIN — AMOXICILLIN AND CLAVULANATE POTASSIUM 875 MG: 875; 125 TABLET, FILM COATED ORAL at 15:25

## 2024-01-13 RX ADMIN — SODIUM CHLORIDE, POTASSIUM CHLORIDE, SODIUM LACTATE AND CALCIUM CHLORIDE 75 ML/HR: 600; 310; 30; 20 INJECTION, SOLUTION INTRAVENOUS at 05:52

## 2024-01-13 ASSESSMENT — PAIN SCALES - GENERAL
PAINLEVEL_OUTOF10: 0 - NO PAIN
PAIN_LEVEL: 2
PAINLEVEL_OUTOF10: 0 - NO PAIN

## 2024-01-13 ASSESSMENT — COGNITIVE AND FUNCTIONAL STATUS - GENERAL
MOBILITY SCORE: 23
HELP NEEDED FOR BATHING: A LITTLE
TOILETING: A LITTLE
CLIMB 3 TO 5 STEPS WITH RAILING: A LITTLE
DRESSING REGULAR LOWER BODY CLOTHING: A LITTLE
CLIMB 3 TO 5 STEPS WITH RAILING: A LITTLE
DAILY ACTIVITIY SCORE: 23
DRESSING REGULAR LOWER BODY CLOTHING: A LITTLE
DAILY ACTIVITIY SCORE: 20
DRESSING REGULAR UPPER BODY CLOTHING: A LITTLE
MOBILITY SCORE: 23

## 2024-01-13 ASSESSMENT — PAIN - FUNCTIONAL ASSESSMENT
PAIN_FUNCTIONAL_ASSESSMENT: 0-10

## 2024-01-13 NOTE — CARE PLAN
The patient's goals for the shift include      The clinical goals for the shift include pt will have no more nausea this shift    The pt rested thru out the night. Vitals are stable. No pain or nausea reported. Will continue to  monitor

## 2024-01-13 NOTE — INTERVAL H&P NOTE
H&P reviewed. The patient was examined and there are no changes to the H&P.  Had ERCP yesterday and removes stones from CBD.   Clinically well  Bilirubin down trending   Patient requested gallbladder surgery during this admission   Plan for lap júnior and IOC today.     Patient expressed understanding that the risks of surgery include but are not limited to bleeding, infection, possible intra-abdominal viscus injury and bile duct injury which may require more extensive surgery, possibility for open surgery, risks of anesthesia, mortality in rare/extreme situations. Patient agreed to proceed.

## 2024-01-13 NOTE — ANESTHESIA PROCEDURE NOTES
Airway  Date/Time: 1/13/2024 10:34 AM  Urgency: elective    Airway not difficult    Staffing  Performed: JANI   Authorized by: RAHEEM Marques    Performed by: RAHEEM Marques  Patient location during procedure: OR    Indications and Patient Condition  Indications for airway management: anesthesia and airway protection  Spontaneous ventilation: present  Sedation level: deep  Preoxygenated: yes  Patient position: sniffing  MILS maintained throughout  Mask difficulty assessment: 0 - not attempted  No planned trial extubation    Final Airway Details  Final airway type: endotracheal airway      Successful airway: ETT  Cuffed: yes   Successful intubation technique: video laryngoscopy  Endotracheal tube insertion site: oral  Blade: Marina  Blade size: #3  ETT size (mm): 7.0  Cormack-Lehane Classification: grade I - full view of glottis  Placement verified by: chest auscultation and capnometry   Cuff volume (mL): 5  Measured from: teeth  ETT to teeth (cm): 21  Number of attempts at approach: 1

## 2024-01-13 NOTE — PROGRESS NOTES
"Rachele Major is a 73 y.o. female on day 2 of admission presenting with Common bile duct (CBD) obstruction.      Subjective   This morning, pt agreed to have cholecystectomy today. After cholecystectomy, pt feeling pretty good with soreness in her abd and no significant \"pain\". Per surgery, would keep pt overnight. Pt agreeable.          Objective     Last Recorded Vitals  /60   Pulse 71   Temp 35.9 °C (96.6 °F)   Resp 16   Wt 101 kg (223 lb)   SpO2 97%   Intake/Output last 3 Shifts:    Intake/Output Summary (Last 24 hours) at 1/13/2024 1344  Last data filed at 1/13/2024 1251  Gross per 24 hour   Intake 4031.25 ml   Output 110 ml   Net 3921.25 ml       Admission Weight  Weight: 109 kg (240 lb) (01/11/24 1546)    Daily Weight  01/11/24 : 101 kg (223 lb)    Image Results  Endoscopic Ultrasound (Upper) w Biopsy  Table formatting from the original result was not included.  Impression  EGD:  The esophagus and stomach appeared normal.  Large periampullary diverticulum.   EUS:  The pancreatic parenchyma was visualized in the entire pancreas and   appeared to have a normal salt and pepper appearance. The pancreatic duct   appeared normal.  The entire bile duct was dilated. The distal and mid bile duct contained   hyperechoic foci with shadowing consistent with stones.    Findings  EGD:  The esophagus and stomach appeared normal.  Large periampullary diverticulum.   EUS:  The pancreatic parenchyma was visualized in the entire pancreas and   appeared to have a normal salt and pepper appearance. The pancreatic duct   appeared normal.  The entire bile duct was dilated. The distal and mid bile duct contained   hyperechoic foci with shadowing consistent with stones.    Recommendation   Proceed with ERCP for continued treatment     Indication  Jaundice, Abnormal CT scan    Staff  Staff Role   Juany Espinosa MD Proceduralist     Medications  See Anesthesia Record.     Preprocedure  A history and physical has been " performed, and patient medication   allergies have been reviewed. The patient's tolerance of previous   anesthesia has been reviewed. The risks and benefits of the procedure and   the sedation options and risks were discussed with the patient. All   questions were answered and informed consent obtained.    Details of the Procedure  The patient underwent general anesthesia, which was administered by an   anesthesia professional. The patient's blood pressure, heart rate, level   of consciousness, oxygen, respirations, ECG and ETCO2 were monitored   throughout the procedure. The endoscope and linear scope were introduced   through the mouth and advanced to the second part of the duodenum.   Retroflexion was performed in the cardia. The patient experienced no blood   loss. The procedure was not difficult. The patient tolerated the procedure   well. There were no apparent adverse events.     Events  Procedure Events   Event Event Time   ENDO SCOPE IN TIME 1/12/2024  4:22 PM   ENDO SCOPE OUT TIME 1/12/2024  5:00 PM     Specimens  No specimens collected    Procedure Location  [address] [address] [phone]    Referring Provider  Juany Espinosa Md  [address] [facility], [address]  [place] [address]    Procedure Provider  Juany Espinosa MD  ERCP  Table formatting from the original result was not included.  Impression  The major papilla was located within a diverticulum.  The common bile duct was cannulated by employing a double guidewire   technique.  Multiple floating filling defects consistent with stones were visualized   in the common bile duct.  Complete biliary sphincterotomy was performed.  Sweeping was performed in the common bile duct. Stones were removed, at   least 8 to 10 were removed, largest was 15 mm.    Findings  The major papilla was native. The major papilla was located within a   diverticulum. The common  bile duct was cannulated with guidewire by   employing a double guidewire technique. Contrast was injected. Multiple   floating filling defects consistent with stones were visualized in the   common bile duct. Complete 8 mm biliary sphincterotomy was performed.   Sweeping was performed in the common bile duct using a balloon. Stones   were removed, achieving complete clearance. At least 8 to 10 stones were   removed, largest was 15 mm. Final balloon sweeps were negative.  Final   cholangiogram revealed no residual filling defects.     Recommendation   Follow up with primary gastroenterologist     Return to referring hospital for care.    - Patient has a contact number available for  emergencies. The signs and   symptoms of potential delayed complications were discussed with the   patient. Return to normal activities tomorrow. Written discharge   instructions were provided to the patient.  - Resume previous diet.  - Continue present medications.  - The findings and recommendations were discussed with the patient.    Indication  Common bile duct (CBD) obstruction    Staff  Staff Role   Juany Espinosa MD Proceduralist     Medications  See Anesthesia Record.    Preprocedure  A history and physical has been performed, and patient medication   allergies have been reviewed. The patient's tolerance of previous   anesthesia has been reviewed. The risks and benefits of the procedure and   the sedation options and risks were discussed with the patient. All   questions were answered and informed consent obtained.  Rectal Indomethacin was administered.    Details of the Procedure  The patient underwent general anesthesia, which was administered by an   anesthesia professional. The patient's blood pressure, heart rate, level   of consciousness, oxygen, respirations, ECG and ETCO2 were monitored   throughout the procedure. The scope was introduced through the mouth and   advanced to the second part of the duodenum. The patient  experienced no   blood loss. The procedure was not difficult. The patient tolerated the   procedure well. There were no apparent adverse events.     Events  Procedure Events   Event Event Time   ENDO SCOPE IN TIME 1/12/2024  4:22 PM   ENDO SCOPE OUT TIME 1/12/2024  5:00 PM     Specimens  No specimens collected    Procedure Location  [facility]  [address] [facility] [address]  [phone]    Referring Provider  Juany Espinosa Md  [address] [facility], [address]  [place] [address]    Procedure Provider  Juany Espinosa MD  [place] GI ERCP  These images are not reportable by radiology and will not be interpreted   by  Radiologists.  ECG 12 lead  Sinus rhythm with Premature atrial complexes  ST & T wave abnormality, consider inferior ischemia  ST & T wave abnormality, consider anterior ischemia  Abnormal ECG  No previous ECGs available  See ED provider note for full interpretation and clinical correlation  Confirmed by Neal Dejesus ([id number]) on 1/12/2024 4:30:24 PM      Physical Exam  Vitals reviewed.   Constitutional:       General: She is not in acute distress.     Appearance: She is obese.   HENT:      Head: Normocephalic and atraumatic.   Eyes:      Extraocular Movements: Extraocular movements intact.      Conjunctiva/sclera: Conjunctivae normal.   Cardiovascular:      Rate and Rhythm: Normal rate and regular rhythm.      Heart sounds: No murmur heard.     No friction rub. No gallop.   Pulmonary:      Effort: Pulmonary effort is normal.      Breath sounds: Normal breath sounds. No wheezing, rhonchi or rales.   Abdominal:      General: Bowel sounds are normal.      Palpations: Abdomen is soft.      Tenderness: There is abdominal tenderness.      Comments: Diffuse abd ttp    Musculoskeletal:         General: No tenderness.      Cervical back: Neck supple.      Right lower leg: No edema.      Left lower leg: No edema.   Skin:     General: Skin is  warm and dry.      Findings: No bruising or rash.   Neurological:      Mental Status: She is alert. Mental status is at baseline.      Comments: Answering questions, following commands. Moving all extremities.    Psychiatric:         Mood and Affect: Mood and affect normal.       Relevant Results               Scheduled medications  amoxicillin-pot clavulanate, 875 mg, oral, q12h ELE  [Held by provider] aspirin, 81 mg, oral, Daily  cyclobenzaprine, 5 mg, oral, TID  docusate sodium, 100 mg, oral, BID  [START ON 1/14/2024] heparin (porcine), 5,000 Units, subcutaneous, q8h  [Held by provider] hydroCHLOROthiazide, 12.5 mg, oral, Daily  insulin lispro, 0-5 Units, subcutaneous, q4h  lactulose, 20 g, oral, Daily  [Held by provider] losartan, 100 mg, oral, Daily  pantoprazole, 40 mg, oral, Daily before breakfast      Continuous medications  lactated Ringer's, 50 mL/hr, Last Rate: 50 mL/hr (01/13/24 1329)      PRN medications  PRN medications: dextrose 10 % in water (D10W), dextrose, glucagon, HYDROcodone-acetaminophen, HYDROcodone-acetaminophen, ondansetron **OR** ondansetron      Assessment/Plan          Active Problems:    Post-op pain    Rachele Major is a 73 y.o. female with pmhx significant for obesity and T2DM presenting for abdominal pain, vomiting, and a HIDA scan concerning for high grade common bile duct obstruction. S/P ERCP showing common bile duct obstruction due to stones.      Acute medical issues  #common bile duct obstruction due to choledocholithiasis, resolved   #c/f ampullary carcinoma   #cholelithiasis  # Chronic cholecystitis s/p cholecystectomy   - CRP elevated, T. Bili 4.3  - CT: common bile duct obstruction possibly due to neoplasm such as a ampullary carcinoma, with large adjacent lymph node  - gen surg and GI on board.   - Zosyn switch to Augmentin 3d.   - IV dilaudid prn pain  - Zofran prn nausea  - s/p ERCP at [facility] 1/12 showing stones in CBD, which were removed.  - s/p cholecystectomy 1/13  by gen surg. Will keep overnight per gen surg.   - follow up with gen surg and GI on d/c     # Hypokalemia - Replete  # Hypomagnesemia - Repleted. Improved    #Acute Kidney Injury, resolved   - follow cr      Chronic medical issues  HTN - hold losartan/hydrochlorothiazide 100-12.5 mg. Consider holding until pcp f/u if bp still borderline.   GERD - Protonix  HLD - hold asa. Consider restart on d/c   T2DM - hold home metformin, start SSI     Fluids: po   Electrolytes: replete prn  Nutrition: clear liquids   GI Ppx: Protonix  DVT Ppx: subcutaneous hep restart 1/14/24      Oxygenation: Room Air  Antibiotics: Zosyn (1/11 - 1/13), Augmentin (1/13-1/15)      Code Status: Full Code     Dispo: 72yo F presenting with acute ascending cholangitis 2/2 common bile duct obstruction. C/f neoplasm. S/p ERCP and cholecystectomy, anticipate d/c 1/14               Charlie Graff DO

## 2024-01-13 NOTE — ANESTHESIA POSTPROCEDURE EVALUATION
Patient: Rachele Major    Procedure Summary       Date: 01/13/24 Room / Location: [facility] OR 06 / [facility]    Anesthesia Start: 1021 Anesthesia Stop:     Procedure: Cholecystectomy Laparoscopy with Cholangiogram (Abdomen) Diagnosis:       Common bile duct (CBD) obstruction      (Common bile duct (CBD) obstruction [K83.1])    Surgeons: Robe Lieberman MD Responsible Provider: Osmin Mckeon MD    Anesthesia Type: Not recorded ASA Status: 2 - Emergent            Anesthesia Type: No value filed.    Vitals Value Taken Time   /66 01/13/24 1210   Temp 36.6 °C (97.9 °F) 01/13/24 1210   Pulse 71 01/13/24 1210   Resp 16 01/13/24 1218   SpO2 100 % 01/13/24 1210       Anesthesia Post Evaluation    Patient location during evaluation: PACU  Patient participation: complete - patient participated  Level of consciousness: awake  Pain score: 2  Pain management: adequate  Airway patency: patent  Cardiovascular status: stable  Respiratory status: nasal cannula  Hydration status: acceptable  Postoperative Nausea and Vomiting: none        No notable events documented.

## 2024-01-13 NOTE — PROGRESS NOTES
"Rachele Major is a 73 y.o. female on day 2 of admission presenting with Common bile duct (CBD) obstruction.    Subjective   No acute GI symptom.  Mild soreness after laparoscopic cholecystectomy       Objective     Physical Exam  Abdominal:      General: Bowel sounds are normal.      Palpations: Abdomen is soft.      Comments: Mild tenderness related to procedure(laparoscopy cholecystectomy)   Neurological:      Mental Status: She is alert and oriented to person, place, and time.         Last Recorded Vitals  Blood pressure 139/60, pulse 71, temperature 35.9 °C (96.6 °F), resp. rate 16, height 1.727 m (5' 8\"), weight 101 kg (223 lb), SpO2 97 %.  Intake/Output last 3 Shifts:  I/O last 3 completed shifts:  In: 3650 (36.1 mL/kg) [I.V.:3450 (34.1 mL/kg); IV Piggyback:200]  Out: 50 (0.5 mL/kg) [Emesis/NG output:50]  Weight: 101.2 kg     Relevant Results      ERCP    Result Date: 1/12/2024  Table formatting from the original result was not included. Impression The major papilla was located within a diverticulum. The common bile duct was cannulated by employing a double guidewire technique. Multiple floating filling defects consistent with stones were visualized in the common bile duct. Complete biliary sphincterotomy was performed. Sweeping was performed in the common bile duct. Stones were removed, at least 8 to 10 were removed, largest was 15 mm. Findings The major papilla was native. The major papilla was located within a diverticulum. The common bile duct was cannulated with guidewire by employing a double guidewire technique. Contrast was injected. Multiple floating filling defects consistent with stones were visualized in the common bile duct. Complete 8 mm biliary sphincterotomy was performed. Sweeping was performed in the common bile duct using a balloon. Stones were removed, achieving complete clearance. At least 8 to 10 stones were removed, largest was 15 mm. Final balloon sweeps were negative.  Final " cholangiogram revealed no residual filling defects. Recommendation  Follow up with primary gastroenterologist Return to referring hospital for care.  - Patient has a contact number available for  emergencies. The signs and symptoms of potential delayed complications were discussed with the patient. Return to normal activities tomorrow. Written discharge instructions were provided to the patient. - Resume previous diet. - Continue present medications. - The findings and recommendations were discussed with the patient. Indication Common bile duct (CBD) obstruction Staff Staff Role Juany Espinosa MD Proceduralist Medications See Anesthesia Record. Preprocedure A history and physical has been performed, and patient medication allergies have been reviewed. The patient's tolerance of previous anesthesia has been reviewed. The risks and benefits of the procedure and the sedation options and risks were discussed with the patient. All questions were answered and informed consent obtained. Rectal Indomethacin was administered. Details of the Procedure The patient underwent general anesthesia, which was administered by an anesthesia professional. The patient's blood pressure, heart rate, level of consciousness, oxygen, respirations, ECG and ETCO2 were monitored throughout the procedure. The scope was introduced through the mouth and advanced to the second part of the duodenum. The patient experienced no blood loss. The procedure was not difficult. The patient tolerated the procedure well. There were no apparent adverse events. Events Procedure Events Event Event Time ENDO SCOPE IN TIME 1/12/2024  4:22 PM ENDO SCOPE OUT TIME 1/12/2024  5:00 PM Specimens No specimens collected Procedure Location [facility] [address] [facility] [address] [phone] [place]erring [place] [id number] [facility], [facility], [address] [address] Procedure Provider Juany  YSABEL Espinosa MD     Endoscopic Ultrasound (Upper) w Biopsy    Result Date: 1/12/2024  Table formatting from the original result was not included. Impression EGD: The esophagus and stomach appeared normal. Large periampullary diverticulum. EUS: The pancreatic parenchyma was visualized in the entire pancreas and appeared to have a normal salt and pepper appearance. The pancreatic duct appeared normal. The entire bile duct was dilated. The distal and mid bile duct contained hyperechoic foci with shadowing consistent with stones. Findings EGD: The esophagus and stomach appeared normal. Large periampullary diverticulum. EUS: The pancreatic parenchyma was visualized in the entire pancreas and appeared to have a normal salt and pepper appearance. The pancreatic duct appeared normal. The entire bile duct was dilated. The distal and mid bile duct contained hyperechoic foci with shadowing consistent with stones. Recommendation  Proceed with ERCP for continued treatment Indication Jaundice, Abnormal CT scan Staff Staff Role Juany Espinosa MD Proceduralist Medications See Anesthesia Record. Preprocedure A history and physical has been performed, and patient medication allergies have been reviewed. The patient's tolerance of previous anesthesia has been reviewed. The risks and benefits of the procedure and the sedation options and risks were discussed with the patient. All questions were answered and informed consent obtained. Details of the Procedure The patient underwent general anesthesia, which was administered by an anesthesia professional. The patient's blood pressure, heart rate, level of consciousness, oxygen, respirations, ECG and ETCO2 were monitored throughout the procedure. The endoscope and linear scope were introduced through the mouth and advanced to the second part of the duodenum. Retroflexion was performed in the cardia. The patient experienced no blood loss. The procedure was not difficult. The patient tolerated  the procedure well. There were no apparent adverse events. Events Procedure Events Event Event Time ENDO SCOPE IN TIME 1/12/2024  4:22 PM ENDO SCOPE OUT TIME 1/12/2024  5:00 PM Specimens No specimens collected Procedure Location [facility] [address] [facility] [address] [phone] Referring [place] [address] [facility], [address], [place] [address] [place] [address] Procedure Provider Juany Espinosa MD     [place] GI ERCP    Result Date: 1/12/2024  These images are not reportable by radiology and will not be interpreted by  Radiologists.    ECG 12 lead    Result Date: 1/12/2024  Sinus rhythm with Premature atrial complexes ST & T wave abnormality, consider inferior ischemia ST & T wave abnormality, consider anterior ischemia Abnormal ECG No previous ECGs available See ED provider note for full interpretation and clinical correlation Confirmed by Neal Dejesus ([id number]) on 1/12/2024 4:30:24 PM    CT abdomen pelvis w IV contrast    Result Date: 1/11/2024  Interpreted By:  Kenyon Ortiz, STUDY: CT ABDOMEN PELVIS W IV CONTRAST;  1/11/2024 4:54 pm   INDICATION: Signs/Symptoms:Epigastric and left upper quadrant abdominal pain however patient had a nuclear medicine scan done just prior to arrival concerning for gallbladder involvement.   COMPARISON: January 11, 2024 nuclear medicine hepatobiliary scan. December 5, 2022 CT calcium score examination.   ACCESSION NUMBER(S): [id number]   ORDERING CLINICIAN: MARTHA TOUSSAINT   TECHNIQUE: CT of the abdomen and pelvis was performed.  Standard contiguous axial images were obtained at 3 mm slice thickness through the abdomen and pelvis. Coronal and sagittal reconstructions at 3 mm slice thickness were performed.   75 mL of Omnipaque 350 were administered intravenously without immediate complication.   FINDINGS: LOWER CHEST: The lung bases are clear. There is a small hiatal hernia.   ABDOMEN:   LIVER: The liver  measures 20 cm length. The liver is hypoattenuating likely indicating steatosis. Normal morphology without detected mass. There are probably geographic areas of fat sparing adjacent to the gallbladder.   BILE DUCTS: There is dilation of the intra and extrahepatic bile ducts with abrupt tapering distal common bile duct. The mid extrahepatic bile duct measures 13 mm caliber coronal series 202, image 46.   There is polypoidal soft tissue density masslike abnormality extending from the ampullary region to the dilated portion of the distal common bile duct 3.0 x 1.7 x 2.1 cm coronal series 202, image 46, axial series 201, image 59. 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 The pattern would be atypical for obstructing debris. Further evaluation is recommended.   There is gas-filled structure along the posterior aspect of the abnormality medial to the descending segment of the duodenum series 201, image 56 most compatible with a duodenal diverticulum with potential tiny neck at the descending duodenum proximal to the ampulla although limited visualization of the neck.   GALLBLADDER: Decompressed. There are probably small gallstones.   PANCREAS: The pancreas is diffusely atrophic. No well-delineated significant pancreatic duct dilation.   SPLEEN: Within normal limits.   ADRENAL GLANDS: Within normal limits.   KIDNEYS AND URETERS: The kidneys are normal in size and enhance symmetrically.  No hydroureteronephrosis or nephroureterolithiasis is identified.   PELVIS:   BLADDER: Decompressed limiting the assessment without evident mass.   REPRODUCTIVE ORGANS: No pelvic mass.   BOWEL: The stomach and bowel are normal caliber without inflammatory change.   VESSELS: The principal central vessels are patent and normal caliber with trace atherosclerosis.   PERITONEUM/RETROPERITONEUM/LYMPH NODES: No ascites or free air,  no fluid collection.  There is a prominent node 5.1 cm craniocaudal diameter along the posterior aspect of the superior pancreatic head anterior to the IVC and adjacent to the hepatic artery coronal series 202, image 50 with smaller adjacent nodes.   BONES AND ABDOMINAL WALL: No suspicious osseous lesions are identified. Degenerative discogenic disease is noted in the lower thoracic and lumbar spine. Unremarkable body wall.       1.  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 Further evaluation recommended including correlation with tumor markers, and tissue sampling. Recommend GI and surgical consultation. MRCP correlation could be considered. 2. Enlarged liver with probable steatosis. 3. Cholelithiasis.     Signed by: Kenyon Ortiz 1/11/2024 6:06 PM Dictation workstation:   [id number]    NM hepatobiliary    Result Date: 1/11/2024  Interpreted By:  Jose [place] and Maltbie Grace STUDY: NM HEPATOBILIARY;  1/11/2024 3:36 pm   INDICATION: Signs/Symptoms:Epigastric pain nausea.   COMPARISON: None.   ACCESSION NUMBER(S): [id number]   ORDERING CLINICIAN: AMRIK BRANDON   TECHNIQUE: DIVISION OF NUCLEAR MEDICINE HEPATOBILIARY SCAN (HIDA)   The patient received an intravenous dose of  5.0 mCi of Tc-99m mebrofenin (Choletec).  Sequential images of the upper abdomen were then acquired over the next 120 minutes.   FINDINGS: Persistent radiotracer retention in the liver parenchyma without excretion into the biliary tree or small bowel. The gallbladder was not visualized by 2 hours after radiotracer injection.       1. Persistent radiotracer retention in the liver parenchyma without excretion into biliary tree or small bowel. Findings are concerning for high-grade common bile duct obstruction, alternatively findings can be seen with severe liver dysfunction. Further evaluation with MRCP may be of added value.     Findings were  communicated with Dr. Rodriguez By Dr. Moriah Jeffrey via phone at 3:30 pm on 1/11/2024 with read back verification. Patient was sent to the emergency department for further evaluation.   I personally reviewed the images/study and I agree with the findings as stated by Nuclear Medicine fellow Moriah Jeffrey MD. This study was interpreted at [facility], [place], [place].   MACRO: Critical Finding:  See findings. Notification was initiated on 1/11/2024 at 3:42 pm by  Moriah Jeffrey.  (**-OCF-**) Instructions:   Signed by: Jose [place] 1/11/2024 3:48 PM Dictation workstation:   [id number]    Order Name Source Comment Collection Info Order Time   SURGICAL PATHOLOGY EXAM GALLBLADDER CHOLECYSTECTOMY Pre-op diagnosis:  Common bile duct (CBD) obstruction [K83.1] Collected By: Robe Lieberman MD 1/13/2024 11:46 AM     Last relevant procedure:                          Assessment/Plan   Active Problems:    Post-op pain  Patient was referred to ER by surgeon due to abnormal findings on HIDA scan.  Labs shows abnormal LFTs, underwent ERCP at [facility], 8 to 10 stone removed, 1 stone was large, complete biliary clearance, did not require stent placement.  Underwent laparoscopic cholecystectomy by Dr. Lieberman today.  Postoperative patient is doing okay.    Monitor CBC CMP  Agree with current management  Call GI as needed.                 Essence Pulido MD

## 2024-01-13 NOTE — OP NOTE
Cholecystectomy Laparoscopy with Cholangiogram Operative Note     Date: 2024 - 2024  OR Location: A OR    Name: Rachele Major, : 1950, Age: 73 y.o., MRN: [id number], Sex: female    Diagnosis  Pre-op Diagnosis     * Common bile duct (CBD) obstruction [K83.1], gallstones  Post-op Diagnosis     * Common bile duct (CBD) obstruction [K83.1]  Gallstones, chronic cholecystitis      Procedures  Laparoscopic cholecystectomy   TAP block, right       Surgeons      * Robe Lieberman - Primary    Resident/Fellow/Other Assistant:  Surgeon(s) and Role:    Procedure Summary  Anesthesia: General  ASA: II  Anesthesia Staff: Anesthesiologist: Osmin Mckeon MD  Estimated Blood Loss: 5mL  Intra-op Medications:   Medication Name Total Dose   BUPivacaine HCl (Marcaine) 0.5 % (5 mg/mL) 20 mL, lidocaine (Xylocaine) 20 mL syringe 40 mL              Anesthesia Record               Intraprocedure I/O Totals       None           Specimen:   ID Type Source Tests Collected by Time   1 : GALLBLADDER AND CONTENTS Tissue GALLBLADDER CHOLECYSTECTOMY SURGICAL PATHOLOGY EXAM Robe Lieberman MD 2024 1127        Staff:   Circulator: Rosy Wilkinson RN; Tamela Parra RN  Scrub Person: Driss Sin      Findings: see below     Indications: Rachele Major is an 73 y.o. female who is having surgery for Common bile duct (CBD) obstruction [K83.1]. S/p ERCP And gallstoones.     The patient was seen in the preoperative area. The risks, benefits, complications, treatment options, non-operative alternatives, expected recovery and outcomes were discussed with the patient. The possibilities of reaction to medication, pulmonary aspiration, injury to surrounding structures, bleeding, recurrent infection, the need for additional procedures, failure to diagnose a condition, and creating a complication requiring transfusion or operation were discussed with the patient. The patient concurred with the proposed plan, giving informed consent.  The  site of surgery was properly noted/marked if necessary per policy. The patient has been actively warmed in preoperative area. Preoperative antibiotics have been ordered and given within 1 hours of incision. Venous thrombosis prophylaxis have been ordered including bilateral sequential compression devices and chemical prophylaxis    Procedure Details:  After general anesthesia was administered, the patient was prepped and draped in the usual sterile fashion.  A supra-umbilical incision was carried down through the skin and subcutaneous tissue. A 12 mm port was placed using open Deleon technique. Pneumoperitoneum was created by insufflating with [id number]  pressure at 15 mm Hg.  Three 5 mm ports were placed in the subxiphoid and right upper quadrant under direct vision. Due to her obesity, the upper abdomen was filled omentum and the gallbladder is buried by omentum with adhesions. We had to place another 5mm port lateral to camera port and inserted liver retractor to press down omentum for exposure. The omental adhesions were taken down off the gallbladder. The gallbladder was then retracted up.  The gallbladder was decompressed as suggested on CT scan. The cystic duct and cystic artery were dissected from the surrounding structures. Critical view of safety was obtained.  The cystic duct was small. We did not perform cholangiogram. The cystic duct was then  triply clipped on the distal (CBD) side and divided. The cystic artery was clipped and divided. The gallbladder was taken from the liver bed with the hook cautery.  The gallbladder bed appeared to be hemostatic.  The gallbladder was placed in an EndoCatch bag which was temporarily stored on the omentum.  The right upper quadrant was irrigated with saline solution.  Hemostasis appeared to be satisfactory. TAP block was then performed on the right side  with anesthetics as below. The EndoCatch bag was then removed from the umbilical port site without difficulty. All  ports were removed. The abdomen was deflated.  The fascia at umbilicus was closed with #0 Vicryl. The subcutaneous tissue at each site was infiltrated with 0.5% Marcaine and 1% Lidocaine 1:1 mixture.  Skin was closed  with subcuticular 4-0 Vicryl. Skin glue was then applied.        Complications:  None; patient tolerated the procedure well.    Disposition: PACU - hemodynamically stable.  Condition: stable       Attending Attestation: I was present and scrubbed for the entire procedure.    Robe Lieberman  Phone Number: [phone]

## 2024-01-14 VITALS
OXYGEN SATURATION: 95 % | WEIGHT: 223 LBS | HEART RATE: 51 BPM | DIASTOLIC BLOOD PRESSURE: 68 MMHG | BODY MASS INDEX: 33.8 KG/M2 | TEMPERATURE: 97.4 F | HEIGHT: 68 IN | RESPIRATION RATE: 12 BRPM | SYSTOLIC BLOOD PRESSURE: 122 MMHG

## 2024-01-14 LAB
ALBUMIN SERPL BCP-MCNC: 3.4 G/DL (ref 3.4–5)
ALP SERPL-CCNC: 128 U/L (ref 33–136)
ALT SERPL W P-5'-P-CCNC: 79 U/L (ref 7–45)
ANION GAP SERPL CALC-SCNC: 13 MMOL/L (ref 10–20)
AST SERPL W P-5'-P-CCNC: 52 U/L (ref 9–39)
BILIRUB SERPL-MCNC: 2 MG/DL (ref 0–1.2)
BUN SERPL-MCNC: 17 MG/DL (ref 6–23)
CALCIUM SERPL-MCNC: 8.8 MG/DL (ref 8.6–10.3)
CHLORIDE SERPL-SCNC: 102 MMOL/L (ref 98–107)
CO2 SERPL-SCNC: 27 MMOL/L (ref 21–32)
CREAT SERPL-MCNC: 1.08 MG/DL (ref 0.5–1.05)
EGFRCR SERPLBLD CKD-EPI 2021: 55 ML/MIN/1.73M*2
ERYTHROCYTE [DISTWIDTH] IN BLOOD BY AUTOMATED COUNT: 12.8 % (ref 11.5–14.5)
GLUCOSE BLD MANUAL STRIP-MCNC: 122 MG/DL (ref 74–99)
GLUCOSE SERPL-MCNC: 115 MG/DL (ref 74–99)
HCT VFR BLD AUTO: 35.9 % (ref 36–46)
HGB BLD-MCNC: 11.9 G/DL (ref 12–16)
MAGNESIUM SERPL-MCNC: 1.62 MG/DL (ref 1.6–2.4)
MCH RBC QN AUTO: 29 PG (ref 26–34)
MCHC RBC AUTO-ENTMCNC: 33.1 G/DL (ref 32–36)
MCV RBC AUTO: 87 FL (ref 80–100)
NRBC BLD-RTO: 0 /100 WBCS (ref 0–0)
PHOSPHATE SERPL-MCNC: 2.3 MG/DL (ref 2.5–4.9)
PLATELET # BLD AUTO: 232 X10*3/UL (ref 150–450)
POTASSIUM SERPL-SCNC: 3.6 MMOL/L (ref 3.5–5.3)
PROT SERPL-MCNC: 6.1 G/DL (ref 6.4–8.2)
RBC # BLD AUTO: 4.11 X10*6/UL (ref 4–5.2)
SODIUM SERPL-SCNC: 138 MMOL/L (ref 136–145)
WBC # BLD AUTO: 9.1 X10*3/UL (ref 4.4–11.3)

## 2024-01-14 PROCEDURE — 83735 ASSAY OF MAGNESIUM: CPT

## 2024-01-14 PROCEDURE — 2500000004 HC RX 250 GENERAL PHARMACY W/ HCPCS (ALT 636 FOR OP/ED)

## 2024-01-14 PROCEDURE — 85027 COMPLETE CBC AUTOMATED: CPT

## 2024-01-14 PROCEDURE — 80053 COMPREHEN METABOLIC PANEL: CPT | Performed by: SURGERY

## 2024-01-14 PROCEDURE — 84100 ASSAY OF PHOSPHORUS: CPT

## 2024-01-14 PROCEDURE — 99239 HOSP IP/OBS DSCHRG MGMT >30: CPT

## 2024-01-14 PROCEDURE — 36415 COLL VENOUS BLD VENIPUNCTURE: CPT

## 2024-01-14 PROCEDURE — 2500000001 HC RX 250 WO HCPCS SELF ADMINISTERED DRUGS (ALT 637 FOR MEDICARE OP): Performed by: SURGERY

## 2024-01-14 PROCEDURE — 82947 ASSAY GLUCOSE BLOOD QUANT: CPT

## 2024-01-14 PROCEDURE — 2500000001 HC RX 250 WO HCPCS SELF ADMINISTERED DRUGS (ALT 637 FOR MEDICARE OP)

## 2024-01-14 RX ADMIN — DOCUSATE SODIUM 100 MG: 100 CAPSULE, LIQUID FILLED ORAL at 08:19

## 2024-01-14 RX ADMIN — LACTULOSE 20 G: 20 SOLUTION ORAL at 08:19

## 2024-01-14 RX ADMIN — AMOXICILLIN AND CLAVULANATE POTASSIUM 875 MG: 875; 125 TABLET, FILM COATED ORAL at 08:19

## 2024-01-14 RX ADMIN — CYCLOBENZAPRINE HYDROCHLORIDE 5 MG: 5 TABLET, FILM COATED ORAL at 08:19

## 2024-01-14 RX ADMIN — PANTOPRAZOLE SODIUM 40 MG: 40 TABLET, DELAYED RELEASE ORAL at 06:27

## 2024-01-14 ASSESSMENT — PAIN - FUNCTIONAL ASSESSMENT: PAIN_FUNCTIONAL_ASSESSMENT: 0-10

## 2024-01-14 ASSESSMENT — PAIN SCALES - GENERAL: PAINLEVEL_OUTOF10: 0 - NO PAIN

## 2024-01-14 ASSESSMENT — COGNITIVE AND FUNCTIONAL STATUS - GENERAL
MOBILITY SCORE: 23
DAILY ACTIVITIY SCORE: 24
CLIMB 3 TO 5 STEPS WITH RAILING: A LITTLE

## 2024-01-14 NOTE — DISCHARGE SUMMARY
Discharge Diagnosis  Common bile duct (CBD) obstruction  Choledocholithiasis, s/p ERCP  Chronic Cholecystitis, s/p laparoscopic cholecystectomy  Acute Kidney Injury, resolved    Issues Requiring Follow-Up  Choledocholithiasis s/p ERCP and Cholecystitis s/p laparoscopic cholecystectomy. Patient to follow-up with GI and Gen Surg in 1-2 weeks.     Discharge Meds     Your medication list        ASK your doctor about these medications        Instructions Last Dose Given Next Dose Due   aspirin 81 mg EC tablet           ergocalciferol 1.25 MG (33258 UT) capsule  Commonly known as: Vitamin D-2           losartan (Cozaar) 2.5 mg/mL oral suspension           metFORMIN 500 mg tablet  Commonly known as: Glucophage           omeprazole 20 mg DR capsule  Commonly known as: PriLOSEC                    Test Results Pending At Discharge  Pending Labs       Order Current Status    Surgical Pathology Exam Collected (01/13/24 1120)    Blood Culture Preliminary result    Blood Culture Preliminary result            Hospital Course  Rachele Major is a 73 y.o. female with history of obesity and T2DM who presented to the hospital for abnormal nuclear scan. Patient reports that for the past couple of years she has been experiencing left sided abdominal pain. Prior to admission, pt had a HIDA scan suggestive of possible biliary obstruction. On exam, pt had left and right upper abd tenderness. Labs concerning for biliary occlusion. CT abd pelvis with contrast showed signs of common bild duct obstruction, likely due to neoplasm, with large adjacent lymph node. Likely liver steatosis. Cholelithiasis.   Pt started on Zosyn and IV fluids and GI on board. Arranged transfer for ERCP at [facility] 1/12/24. There, ERCP performed with EUS; entire bile duct dilation found with multiple shadows consistent with stones, which were removed. Pancreatic parenchyma was visualized in the entire pancreas and appeared to have a normal salt and pepper appearance.  The pancreatic duct appeared normal. There were large periampullary diverticulum.   Pt transported back to [facility] and recovered. She was stable and had improvement in cholestasis labs. Pt agreed to cholecystectomy on 1/13/24 and it was performed without complications, no intraoperative cholangiogram performed.   Pt discharged home and will follow up with general surgery and GI, in addition to her PCP.       Pertinent Physical Exam At Time of Discharge  Physical Exam  Vitals reviewed.   Constitutional:       General: She is not in acute distress.     Appearance: She is obese.   HENT:      Head: Normocephalic and atraumatic.   Eyes:      Extraocular Movements: Extraocular movements intact.      Conjunctiva/sclera: Conjunctivae normal.   Cardiovascular:      Rate and Rhythm: Normal rate and regular rhythm.      Heart sounds: No murmur heard.     No friction rub. No gallop.   Pulmonary:      Effort: Pulmonary effort is normal.      Breath sounds: Normal breath sounds. No wheezing, rhonchi or rales.   Abdominal:      General: Bowel sounds are normal.      Palpations: Abdomen is soft.      Tenderness: There is abdominal tenderness.      Comments: Diffuse abd ttp    Musculoskeletal:         General: No tenderness.      Cervical back: Neck supple.      Right lower leg: No edema.      Left lower leg: No edema.   Skin:     General: Skin is warm and dry.      Findings: No bruising or rash.   Neurological:      Mental Status: She is alert. Mental status is at baseline.      Comments: Answering questions, following commands. Moving all extremities.    Psychiatric:         Mood and Affect: Mood and affect normal.     Outpatient Follow-Up  Future Appointments   Date Time Provider Department Center   2/2/2024 11:00 AM Reid Rodriguez MD [id number] Jackson Mathews DO  Internal Medicine PGY1

## 2024-01-14 NOTE — DISCHARGE INSTRUCTIONS
- The following recommendations are made for you at time of hospital discharge:   - Please take your home medications as instructed prior to hospitalization.   - No changes to your home medications have been made during your hospital stay.   - Please follow-up with General Surgery in 1-2 weeks.  - Please follow-up with Gastroenterology in 1-2 weeks.  - Please follow-up with your primary care provider within 1-2 weeks from time of discharge. Please call your PCP's office to schedule an appointment. Likely will need to bring photo ID and insurance card to your appointment.   - If you experience any worsening symptoms or any new acute concerns arise, please contact your primary care provider to discuss and possibly arrange an appointment. If you cannot get in touch with provider or severe symptoms are present, please return to nearest emergency room/urgent care for evaluation and treatment.       PATIENT INSTRUCTIONS AFTER GALL BLADDER SURGERY (CHOLECYSTECTOMY)    FOLLOW-UP: Please make an appointment with Dr. Robe Lieberman in 2 week. Call Dr. Lieberman office or come to Emergency Department (ED)  immediately if you have any fevers greater than 102.5 degrees Fahrenheit, drainage from your wound that is not clear or looks infected, persistent bleeding, increasing abdominal pain, problems urinating, or persistent nausea/vomiting or any concerns. You should be aware that you may have right shoulder pain after surgery and that this will progressively go away. This is called 'referred pain' and is from the area of the gallbladder. It can also be caused by gas that may be trapped under the diaphragm from the surgery, especially if it was performed laparoscopically through mini-incisions. This gas will progressively get reabsorbed by your body.     WOUND CARE INSTRUCTIONS: Keep a dry, clean dressing on the wound if there is drainage. The initial bandage may be removed after 24 hours. Once the wound has quit draining, you may leave it  open to air. If clothing rubs against the wound or causes irritation and the wound is not draining, you may cover it with a dry dressing during the daytime. Try to keep the wound dry and avoid ointments on the wound unless directed to do so. If the wound becomes bright red and painful or starts to drain infected material that is not clear, please contact Dr. Lieberman office immediately.  You should also call if you begin to drain fluid that is thin and greenish-brown from the wound and appears to look like bile. If the wound is mildly pink and has a thick firm ridge underneath it, this is normal and is referred to as a healing ridge. This will resolve over the next 4-6 weeks.    If surgery is done in open fashion, staples/sutures will be removed at your follow up appointment if staples/sutures are used to close the skin.  If surgery is done laparoscopically, the incisions are usually covered with skin glue. The sutures are under the skin. No suture removal is needed.     Some bruise around the incision is normal. You should call Dr. Lieberman office or come to ED with no delay if the bruise is expanding and painful.     DIET: You may eat any foods that you can tolerate. It is a good idea to eat a high fiber diet, and take in plenty of fluids to prevent constipation. If you do become constipated, you may want to take a mild laxative or take Dulcolax tablets on a daily basis until your bowel habits are regular. After recent abdominal surgery, constipation can be very uncomfortable and straining.    ACTIVITY: You are encouraged to cough and take deep breaths, or if you were given one, use your incentive spirometer every 15-30 minutes when awake. This will help prevent respiratory complications and low grade fevers post-operatively. You may want to hug a pillow when coughing and sneezing to add additional support to the surgical area which will decrease pain during these times. You are encouraged to walk and engage in light activity  for the next two weeks. You should not lift more than 20 pounds during this time frame as it could put you at increased risk for a post-operative hernia. Twenty pounds is roughly equivalent to a plastic bag of groceries.     If surgery is done in open fashion, you should not lift more than 20 pounds or do sit ups for 6-8 weeks. You should also wear an abdominal binder for 6-8 weeks. You do not need to wear binder when you sleep.      MEDICATIONS: Try to take narcotic medications and anti-inflammatory medications, such as Tylenol, ibuprofen, Naprosyn, etc., with food. This will minimize stomach upset from the medication. Should you develop nausea and vomiting from the pain medication, or develop a rash, please discontinue the medication and contact your physician. You should not drive, make important decisions, or operate machinery when taking narcotic pain medication.    IF YOU TAKE BLOOD THINNERS SUCH AS PLAVIX, XARELTO, ELIQUIS, COUMADIN OR OTHERS, YOU CAN RESTART THE MEDICATION ON THIS DATE:  1/15      QUESTIONS: Please feel free to call Dr. Lieberman office if you have any questions.

## 2024-01-14 NOTE — NURSING NOTE
1930: assumed pt care,  at bedside    2024: gave pt IS and taught how to C&DB. Also explained to pt why we have to get her blood sugar and insulin ordered, pt stated she was willing to get insulin this one time only b/c she is a pre-diabetic.     1-14-24/0240: let resident Dr. Gagnon know pt does not want an accucheck or insulin at this time

## 2024-01-14 NOTE — PROGRESS NOTES
General Surgery/Trauma Inpatient Progress Note    Interval History:  Rachele Major is a 73 y.o. female     Overnight Event:   None     Complaints:   Feels good   Wants to eat and go home     Past Medical History:  Past Medical History:   Diagnosis Date    GERD (gastroesophageal reflux disease)     HTN (hypertension)         Past Surgical History:  Past Surgical History:   Procedure Laterality Date    WRIST SURGERY          Medications:  Current Outpatient Medications   Medication Instructions    aspirin 81 mg, oral, Daily RT    ergocalciferol (VITAMIN D-2) 50,000 Units, oral, Weekly    losartan-hydrochlorothiazide (Hyzaar) 100-25 mg tablet 1 tablet, oral, Daily    metFORMIN (GLUCOPHAGE) 500 mg, oral, 2 times daily with meals    omeprazole (PRILOSEC) 20 mg    ondansetron (ZOFRAN) 4 mg, oral, Every 8 hours PRN        Allergies:  Allergies   Allergen Reactions    Amlodipine Swelling        Family History:  No family history on file.     Social History:  Social History     Socioeconomic History    Marital status:      Spouse name: None    Number of children: None    Years of education: None    Highest education level: None   Occupational History    None   Tobacco Use    Smoking status: Never    Smokeless tobacco: Never   Vaping Use    Vaping Use: Never used   Substance and Sexual Activity    Alcohol use: Never    Drug use: Never    Sexual activity: Defer   Other Topics Concern    None   Social History Narrative    None     Social Determinants of Health     Financial Resource Strain: Low Risk  (1/11/2024)    Overall Financial Resource Strain (CARDIA)     Difficulty of Paying Living Expenses: Not hard at all   Food Insecurity: Not on file   Transportation Needs: No Transportation Needs (1/11/2024)    PRAPARE - Transportation     Lack of Transportation (Medical): No     Lack of Transportation (Non-Medical): No   Physical Activity: Not on file   Stress: Not on file   Social Connections: Not on file   Intimate  Partner Violence: Not on file   Housing Stability: Low Risk  (1/11/2024)    Housing Stability Vital Sign     Unable to Pay for Housing in the Last Year: No     Number of Places Lived in the Last Year: 1     Unstable Housing in the Last Year: No        Review of Systems:  A complete 12 point review of systems was performed. It is otherwise negative except as noted in the above interval history.     Vital Signs:  Vitals:    01/14/24 0525   BP: 122/68   Pulse: 51   Resp: 12   Temp: 36.3 °C (97.4 °F)   SpO2: 95%      Body mass index is 33.91 kg/m².      Physical Exam:  General: No acute distress.   Neuro: Alert and oriented ×3. Follows commands.  Face: Atraumatic, no visible skin lesion.   Head: Atraumatic  Eyes: Pupils equal reactive to light. Extraocular motions intact.  Ears: Hears normal speaking voice.  Mouth, Nose, Throat: Mucous membranes moist.  Normal dentition.  Neck: Supple. No visible masses.  Breast: Not examined.   Lung: Patent airway and no labored breath.   Vascular: Palpable radial pulses bilaterally.  Abdomen: soft, NT, ND.  Incision clean and dry   Rectal and Perianal: Not examined.   Genitourinary: Not examined.   Musculoskeletal: Moves all extremities.  Normal range of motion.  Extremities: No cyanosis. No edema.   Lymphatic: No palpable lymph nodes.  Skin: No rashes or lesions.  Psychological: Normal affect      Laboratory Values:  CBC:   Lab Results   Component Value Date    WBC 9.1 01/14/2024    RBC 4.11 01/14/2024    HGB 11.9 (L) 01/14/2024    HCT 35.9 (L) 01/14/2024     01/14/2024       RFP:   Lab Results   Component Value Date     01/14/2024    K 3.6 01/14/2024     01/14/2024    CO2 27 01/14/2024    BUN 17 01/14/2024    CREATININE 1.08 (H) 01/14/2024    CALCIUM 8.8 01/14/2024    MG 1.62 01/14/2024    PHOS 2.3 (L) 01/14/2024        LFTs:   Lab Results   Component Value Date    PROT 6.1 (L) 01/14/2024    ALBUMIN 3.4 01/14/2024    BILITOT 2.0 (H) 01/14/2024    BILIDIR 2.6 (H)  01/13/2024    ALKPHOS 128 01/14/2024    AST 52 (H) 01/14/2024    ALT 79 (H) 01/14/2024            Imaging:  I have personally reviewed the images and the radiologist's report.  ERCP    Result Date: 1/12/2024  Table formatting from the original result was not included. Impression The major papilla was located within a diverticulum. The common bile duct was cannulated by employing a double guidewire technique. Multiple floating filling defects consistent with stones were visualized in the common bile duct. Complete biliary sphincterotomy was performed. Sweeping was performed in the common bile duct. Stones were removed, at least 8 to 10 were removed, largest was 15 mm. Findings The major papilla was native. The major papilla was located within a diverticulum. The common bile duct was cannulated with guidewire by employing a double guidewire technique. Contrast was injected. Multiple floating filling defects consistent with stones were visualized in the common bile duct. Complete 8 mm biliary sphincterotomy was performed. Sweeping was performed in the common bile duct using a balloon. Stones were removed, achieving complete clearance. At least 8 to 10 stones were removed, largest was 15 mm. Final balloon sweeps were negative.  Final cholangiogram revealed no residual filling defects. Recommendation  Follow up with primary gastroenterologist Return to referring hospital for care.  - Patient has a contact number available for  emergencies. The signs and symptoms of potential delayed complications were discussed with the patient. Return to normal activities tomorrow. Written discharge instructions were provided to the patient. - Resume previous diet. - Continue present medications. - The findings and recommendations were discussed with the patient. Indication Common bile duct (CBD) obstruction Staff Staff Role Juany Espinosa MD Proceduralist Medications See Anesthesia Record. Preprocedure A history and physical has been  performed, and patient medication allergies have been reviewed. The patient's tolerance of previous anesthesia has been reviewed. The risks and benefits of the procedure and the sedation options and risks were discussed with the patient. All questions were answered and informed consent obtained. Rectal Indomethacin was administered. Details of the Procedure The patient underwent general anesthesia, which was administered by an anesthesia professional. The patient's blood pressure, heart rate, level of consciousness, oxygen, respirations, ECG and ETCO2 were monitored throughout the procedure. The scope was introduced through the mouth and advanced to the second part of the duodenum. The patient experienced no blood loss. The procedure was not difficult. The patient tolerated the procedure well. There were no apparent adverse events. Events Procedure Events Event Event Time ENDO SCOPE IN TIME 1/12/2024  4:22 PM ENDO SCOPE OUT TIME 1/12/2024  5:00 PM Specimens No specimens collected Procedure Location [facility] [address] [facility] [address] [phone] [place] [address] [facility] [address], [place] [address] Procedure Provider Juany Espinosa MD     Endoscopic Ultrasound (Upper) w Biopsy    Result Date: 1/12/2024  Table formatting from the original result was not included. Impression EGD: The esophagus and stomach appeared normal. Large periampullary diverticulum. EUS: The pancreatic parenchyma was visualized in the entire pancreas and appeared to have a normal salt and pepper appearance. The pancreatic duct appeared normal. The entire bile duct was dilated. The distal and mid bile duct contained hyperechoic foci with shadowing consistent with stones. Findings EGD: The esophagus and stomach appeared normal. Large periampullary diverticulum. EUS: The pancreatic parenchyma was visualized in the entire pancreas and  appeared to have a normal salt and pepper appearance. The pancreatic duct appeared normal. The entire bile duct was dilated. The distal and mid bile duct contained hyperechoic foci with shadowing consistent with stones. Recommendation  Proceed with ERCP for continued treatment Indication Jaundice, Abnormal CT scan Staff Staff Role Juany Espinosa MD Proceduralist Medications See Anesthesia Record. Preprocedure A history and physical has been performed, and patient medication allergies have been reviewed. The patient's tolerance of previous anesthesia has been reviewed. The risks and benefits of the procedure and the sedation options and risks were discussed with the patient. All questions were answered and informed consent obtained. Details of the Procedure The patient underwent general anesthesia, which was administered by an anesthesia professional. The patient's blood pressure, heart rate, level of consciousness, oxygen, respirations, ECG and ETCO2 were monitored throughout the procedure. The endoscope and linear scope were introduced through the mouth and advanced to the second part of the duodenum. Retroflexion was performed in the cardia. The patient experienced no blood loss. The procedure was not difficult. The patient tolerated the procedure well. There were no apparent adverse events. Events Procedure Events Event Event Time ENDO SCOPE IN TIME 1/12/2024  4:22 PM ENDO SCOPE OUT TIME 1/12/2024  5:00 PM Specimens No specimens collected Procedure Location [address] [address] [phone] [place] [address] [facility] [address] [place] [address] Procedure Provider Juany Espinosa MD     [place] GI ERCP    Result Date: 1/12/2024  These images are not reportable by radiology and will not be interpreted by  Radiologists.    ECG 12 lead    Result Date: 1/12/2024  Sinus rhythm with Premature atrial  complexes ST & T wave abnormality, consider inferior ischemia ST & T wave abnormality, consider anterior ischemia Abnormal ECG No previous ECGs available See ED provider note for full interpretation and clinical correlation Confirmed by Neal Dejesus ([id number]) on 1/12/2024 4:30:24 PM    CT abdomen pelvis w IV contrast    Result Date: 1/11/2024  Interpreted By:  Kenyon Ortiz, STUDY: CT ABDOMEN PELVIS W IV CONTRAST;  1/11/2024 4:54 pm   INDICATION: Signs/Symptoms:Epigastric and left upper quadrant abdominal pain however patient had a nuclear medicine scan done just prior to arrival concerning for gallbladder involvement.   COMPARISON: January 11, 2024 nuclear medicine hepatobiliary scan. December 5, 2022 CT calcium score examination.   ACCESSION NUMBER(S): [id number]   ORDERING CLINICIAN: MARTHA TOUSSAINT   TECHNIQUE: CT of the abdomen and pelvis was performed.  Standard contiguous axial images were obtained at 3 mm slice thickness through the abdomen and pelvis. Coronal and sagittal reconstructions at 3 mm slice thickness were performed.   75 mL of Omnipaque 350 were administered intravenously without immediate complication.   FINDINGS: LOWER CHEST: The lung bases are clear. There is a small hiatal hernia.   ABDOMEN:   LIVER: The liver measures 20 cm length. The liver is hypoattenuating likely indicating steatosis. Normal morphology without detected mass. There are probably geographic areas of fat sparing adjacent to the gallbladder.   BILE DUCTS: There is dilation of the intra and extrahepatic bile ducts with abrupt tapering distal common bile duct. The mid extrahepatic bile duct measures 13 mm caliber coronal series 202, image 46.   There is polypoidal soft tissue density masslike abnormality extending from the ampullary region to the dilated portion of the distal common bile duct 3.0 x 1.7 x 2.1 cm coronal series 202, image 46, axial series 201, image 59. The finding most likely represents neoplasm such as a  ampullary carcinoma versus periampullary carcinoma of biliary or pancreatic origin or unusual subtype. The pattern would be atypical for obstructing debris. Further evaluation is recommended.   There is gas-filled structure along the posterior aspect of the abnormality medial to the descending segment of the duodenum series 201, image 56 most compatible with a duodenal diverticulum with potential tiny neck at the descending duodenum proximal to the ampulla although limited visualization of the neck.   GALLBLADDER: Decompressed. There are probably small gallstones.   PANCREAS: The pancreas is diffusely atrophic. No well-delineated significant pancreatic duct dilation.   SPLEEN: Within normal limits.   ADRENAL GLANDS: Within normal limits.   KIDNEYS AND URETERS: The kidneys are normal in size and enhance symmetrically.  No hydroureteronephrosis or nephroureterolithiasis is identified.   PELVIS:   BLADDER: Decompressed limiting the assessment without evident mass.   REPRODUCTIVE ORGANS: No pelvic mass.   BOWEL: The stomach and bowel are normal caliber without inflammatory change.   VESSELS: The principal central vessels are patent and normal caliber with trace atherosclerosis.   PERITONEUM/RETROPERITONEUM/LYMPH NODES: No ascites or free air, no fluid collection.  There is a prominent node 5.1 cm craniocaudal diameter along the posterior aspect of the superior pancreatic head anterior to the IVC and adjacent to the hepatic artery coronal series 202, image 50 with smaller adjacent nodes.   BONES AND ABDOMINAL WALL: No suspicious osseous lesions are identified. Degenerative discogenic disease is noted in the lower thoracic and lumbar spine. Unremarkable body wall.       1.  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 Further evaluation recommended including correlation with tumor markers, and tissue sampling. Recommend GI and  surgical consultation. MRCP correlation could be considered. 2. Enlarged liver with probable steatosis. 3. Cholelithiasis.     Signed by: Kenyon Ortiz 1/11/2024 6:06 PM Dictation workstation:   [id number]    NM hepatobiliary    Result Date: 1/11/2024  Interpreted By:  Jose Snyder and Maltbie Grace STUDY: NM HEPATOBILIARY;  1/11/2024 3:36 pm   INDICATION: Signs/Symptoms:Epigastric pain nausea.   COMPARISON: None.   ACCESSION NUMBER(S): [id number]   ORDERING CLINICIAN: AMRIK BRANDON   TECHNIQUE: DIVISION OF NUCLEAR MEDICINE HEPATOBILIARY SCAN (HIDA)   The patient received an intravenous dose of  5.0 mCi of Tc-99m mebrofenin (Choletec).  Sequential images of the upper abdomen were then acquired over the next 120 minutes.   FINDINGS: Persistent radiotracer retention in the liver parenchyma without excretion into the biliary tree or small bowel. The gallbladder was not visualized by 2 hours after radiotracer injection.       1. Persistent radiotracer retention in the liver parenchyma without excretion into biliary tree or small bowel. Findings are concerning for high-grade common bile duct obstruction, alternatively findings can be seen with severe liver dysfunction. Further evaluation with MRCP may be of added value.     Findings were communicated with Dr. [place] By Dr. Moriah Jeffrey via phone at 3:30 pm on 1/11/2024 with read back verification. Patient was sent to the emergency department for further evaluation.   I personally reviewed the images/study and I agree with the findings as stated by Nuclear Medicine fellow Moriah Jeffrey MD. This study was interpreted at [facility], [place], [place].   MACRO: Critical Finding:  See findings. Notification was initiated on 1/11/2024 at 3:42 pm by  Moriah Jeffrey.  (**-OCF-**) Instructions:   Signed by: Jose Snyder 1/11/2024 3:48 PM Dictation workstation:   [id number]        Assessment:  This is a 73 y.o. female   S/P ERCP for CBD stone on  1/12  Gallstones, POD1, S/p lap júnior   Doing well  LFT continue to improve     Plan:  Supportive care per medicine   Regular diet   OK to go home from my aspect   Follow up with me in clinic in 2 weeks       Robe Lieberman MD [facility]  General Surgery  Office: [phone]  Fax:     [phone]  10:27 AM   01/14/24

## 2024-01-14 NOTE — NURSING NOTE
0760 Assumed care of pt.     0820 Medications given and assessment completed. No complaints of pain or discomfort. 5 abdominal lap sites intact with no drainage. Diet changed to regular. No bowel movement or passing gas as of yet. Bowel sounds audible.     1005 Pt ate 100% of her breakfast. States she is having no discomfort or nausea. Still no bowel movement. Encouraged ambulation.     1023 Pt ambulating in hallways.

## 2024-01-15 ENCOUNTER — APPOINTMENT (OUTPATIENT)
Dept: RADIOLOGY | Facility: HOSPITAL | Age: 74
End: 2024-01-15
Payer: MEDICARE

## 2024-01-15 NOTE — ADDENDUM NOTE
Encounter addended by: Megan Carmona RN on: 1/15/2024 8:08 AM   Actions taken: Contacts section saved, Flowsheet accepted

## 2024-01-16 LAB
BACTERIA BLD CULT: NORMAL
BACTERIA BLD CULT: NORMAL

## 2024-01-16 NOTE — SIGNIFICANT EVENT
Follow Up Phone Call    Outgoing phone call    Spoke to: Rachele Major Relationship:self   Phone number: [phone]      Outcome: I left a message on answering machine   Chief Complaint   Patient presents with    Abdominal Pain          Diagnosis:Not applicable

## 2024-01-17 NOTE — SIGNIFICANT EVENT
Follow Up Phone Call    Outgoing phone call    Spoke to: Rachele Major Relationship:self   Phone number: [phone]      Outcome: contacted patient/ family   Chief Complaint   Patient presents with    Abdominal Pain          Diagnosis:Not applicable

## 2024-01-22 LAB
LABORATORY COMMENT REPORT: NORMAL
PATH REPORT.FINAL DX SPEC: NORMAL
PATH REPORT.GROSS SPEC: NORMAL
PATH REPORT.RELEVANT HX SPEC: NORMAL
PATH REPORT.TOTAL CANCER: NORMAL

## 2024-01-30 ENCOUNTER — OFFICE VISIT (OUTPATIENT)
Dept: SURGERY | Facility: CLINIC | Age: 74
End: 2024-01-30
Payer: MEDICARE

## 2024-01-30 ENCOUNTER — LAB (OUTPATIENT)
Dept: LAB | Facility: LAB | Age: 74
End: 2024-01-30
Payer: MEDICARE

## 2024-01-30 VITALS
WEIGHT: 218.6 LBS | BODY MASS INDEX: 38.72 KG/M2 | HEART RATE: 77 BPM | DIASTOLIC BLOOD PRESSURE: 74 MMHG | OXYGEN SATURATION: 97 % | SYSTOLIC BLOOD PRESSURE: 143 MMHG

## 2024-01-30 DIAGNOSIS — Z09 POSTOPERATIVE EXAMINATION: ICD-10-CM

## 2024-01-30 DIAGNOSIS — Z90.49 HISTORY OF LAPAROSCOPIC CHOLECYSTECTOMY: Primary | ICD-10-CM

## 2024-01-30 DIAGNOSIS — Z90.49 HISTORY OF LAPAROSCOPIC CHOLECYSTECTOMY: ICD-10-CM

## 2024-01-30 LAB
ALBUMIN SERPL BCP-MCNC: 4.3 G/DL (ref 3.4–5)
ALP SERPL-CCNC: 163 U/L (ref 33–136)
ALT SERPL W P-5'-P-CCNC: 42 U/L (ref 7–45)
AST SERPL W P-5'-P-CCNC: 46 U/L (ref 9–39)
BILIRUB DIRECT SERPL-MCNC: 0.3 MG/DL (ref 0–0.3)
BILIRUB SERPL-MCNC: 1 MG/DL (ref 0–1.2)
PROT SERPL-MCNC: 7.3 G/DL (ref 6.4–8.2)

## 2024-01-30 PROCEDURE — 99024 POSTOP FOLLOW-UP VISIT: CPT | Performed by: SURGERY

## 2024-01-30 PROCEDURE — 1036F TOBACCO NON-USER: CPT | Performed by: SURGERY

## 2024-01-30 PROCEDURE — 3077F SYST BP >= 140 MM HG: CPT | Performed by: SURGERY

## 2024-01-30 PROCEDURE — 1111F DSCHRG MED/CURRENT MED MERGE: CPT | Performed by: SURGERY

## 2024-01-30 PROCEDURE — 1126F AMNT PAIN NOTED NONE PRSNT: CPT | Performed by: SURGERY

## 2024-01-30 PROCEDURE — 80076 HEPATIC FUNCTION PANEL: CPT

## 2024-01-30 PROCEDURE — 3078F DIAST BP <80 MM HG: CPT | Performed by: SURGERY

## 2024-01-30 PROCEDURE — 36415 COLL VENOUS BLD VENIPUNCTURE: CPT

## 2024-01-30 PROCEDURE — 1159F MED LIST DOCD IN RCRD: CPT | Performed by: SURGERY

## 2024-01-30 NOTE — PROGRESS NOTES
Subjective   Patient ID: Rachele Major is a 73 y.o. female who presents for Post-op (1/13/2024 Lap júnior ).  HPI  ERCP on 1/12 for CBD stone   Lap júnior on 1/13   Doing well   No problem with eating and bowel   Some pain at the belt line on the left side, small lump and bruise (receding), likely from heparin/lovenox injection     Review of Systems   All other systems reviewed and are negative.      Objective   Physical Exam  Constitutional:       Appearance: Normal appearance.   Pulmonary:      Effort: Pulmonary effort is normal.   Abdominal:      Comments: Soft, NT, ND.   Incisions healed well.            Assessment/Plan   ERCP on 1/12 for CBD stone   Lap júnior on 1/13   Doing well     -discussed pathology   -repeat LFT. Will call patient if any concern.   -follow up with me as needed   -call if any concern       Robe Lieberman MD 01/30/24 10:13 AM

## 2024-01-30 NOTE — LETTER
January 30, 2024     Toñito Payne DO  [address]  [facility] [address]    Patient: Rachele Major   YOB: 1950   Date of Visit: 1/30/2024       Dear Dr. Toñito Payne DO:    Thank you for referring Rachele Major to me for evaluation. Below are my notes for this consultation.  If you have questions, please do not hesitate to call me. I look forward to following your patient along with you.       Sincerely,     Robe Lieberman MD      CC: No Recipients  ______________________________________________________________________________________    Subjective  Patient ID: Rachele Major is a 73 y.o. female who presents for Post-op (1/13/2024 Lap júnior ).  HPI  ERCP on 1/12 for CBD stone   Lap júnior on 1/13   Doing well   No problem with eating and bowel   Some pain at the belt line on the left side, small lump and bruise (receding), likely from heparin/lovenox injection     Review of Systems   All other systems reviewed and are negative.      Objective  Physical Exam  Constitutional:       Appearance: Normal appearance.   Pulmonary:      Effort: Pulmonary effort is normal.   Abdominal:      Comments: Soft, NT, ND.   Incisions healed well.            Assessment/Plan  ERCP on 1/12 for CBD stone   Lap júnior on 1/13   Doing well     -discussed pathology   -repeat LFT. Will call patient if any concern.   -follow up with me as needed   -call if any concern       Robe Lieberman MD 01/30/24 10:13 AM

## 2024-02-02 ENCOUNTER — APPOINTMENT (OUTPATIENT)
Dept: SURGERY | Facility: CLINIC | Age: 74
End: 2024-02-02
Payer: MEDICARE

## (undated) DEVICE — APPLICATOR, CHLORAPREP, W/ORANGE TINT, 26ML

## (undated) DEVICE — CATHETER, IV, ANGIOCATH, 14 G X 1.88 IN, FEP POLYMER

## (undated) DEVICE — SUTURE, VICRYL, 4-0, 18 IN, PS2, UNDYED

## (undated) DEVICE — CLIP, ENDO APPLIER LIGAMAX 5MM

## (undated) DEVICE — SCISSOR, MINI ENDO CUT, TIPS, DISP

## (undated) DEVICE — SYRINGE, 20 CC, LUER LOCK, MONOJECT, W/O CAP, LF

## (undated) DEVICE — TROCAR, OPTICAL BLADELESS 5MM X 100 W/ADVANCED FIXATION

## (undated) DEVICE — CARE KIT, LAPAROSCOPIC, ADVANCED

## (undated) DEVICE — GRASPER TIP, MODIFIED MARYLAND, DISP

## (undated) DEVICE — CAUTERY, PENCIL, PUSH BUTTON, SMOKE EVAC, 70MM

## (undated) DEVICE — CHOLANGIOGRAM SET, LAPAROSCOPIC, W/KARLAN BALLOON CATHETER, DOUBLE LUMEN, 4 FR, 60 CM

## (undated) DEVICE — DRAPE, C-ARM IMAGE

## (undated) DEVICE — CABLE, ELECTROSURGICAL, MONOPOLAR, LAPAROSCOPIC, 10 FT, LF

## (undated) DEVICE — DRAPE PACK, LAPAROSCOPIC CHOLECYSTECTOMY, CUSTOM, GEAUGA

## (undated) DEVICE — ACCESS SYS, KII SHIELDED BLADED, Z-THREAD, 5X100CM

## (undated) DEVICE — TROCAR SYSTEM, BALLOON, KII GELPORT, 12 X 100MM

## (undated) DEVICE — ASSEMBLY, STRYKER FLOW 2, SUCTION IRRIGATOR, WITH TIP

## (undated) DEVICE — ADHESIVE, SKIN, LIQUIBAND EXCEED

## (undated) DEVICE — GRASPER, WITH RATCHET HANDLE, 5MM

## (undated) DEVICE — SUTURE, VICRYL, 3-0, 27 IN, SH-1, VIOLET

## (undated) DEVICE — RETRIEVAL SYSTEM, MONARCH, 10MM DISP ENDOSCOPIC

## (undated) DEVICE — DRAPE, INSTRUMENT, W/POUCH, STERI DRAPE, 9 5/8 X 18 LONG

## (undated) DEVICE — GRASPER TIP, RIGHT ANGLE, 5MM, DISP

## (undated) DEVICE — SUTURE, VICRYL, 0, 27 IN, UR-6, VIOLET